# Patient Record
Sex: FEMALE | Race: OTHER | Employment: FULL TIME | URBAN - METROPOLITAN AREA
[De-identification: names, ages, dates, MRNs, and addresses within clinical notes are randomized per-mention and may not be internally consistent; named-entity substitution may affect disease eponyms.]

---

## 2017-03-17 ENCOUNTER — HOSPITAL ENCOUNTER (OUTPATIENT)
Dept: LAB | Age: 37
Discharge: HOME | End: 2017-03-17

## 2017-03-17 LAB
ALBUMIN SERPL BCP-MCNC: 3.8 G/DL (ref 3.5–5)
ALBUMIN/GLOB SERPL: 1 {RATIO} (ref 1.1–2.2)
ALP SERPL-CCNC: 87 U/L (ref 45–117)
ALT SERPL-CCNC: 26 U/L (ref 12–78)
ANION GAP BLD CALC-SCNC: 9 MMOL/L (ref 5–15)
AST SERPL W P-5'-P-CCNC: 16 U/L (ref 15–37)
BILIRUB SERPL-MCNC: 0.5 MG/DL (ref 0.2–1)
BUN SERPL-MCNC: 14 MG/DL (ref 6–20)
BUN/CREAT SERPL: 25 (ref 12–20)
CALCIUM SERPL-MCNC: 8.5 MG/DL (ref 8.5–10.1)
CHLORIDE SERPL-SCNC: 103 MMOL/L (ref 97–108)
CHOLEST SERPL-MCNC: 159 MG/DL
CO2 SERPL-SCNC: 25 MMOL/L (ref 21–32)
CREAT SERPL-MCNC: 0.55 MG/DL (ref 0.55–1.02)
GLOBULIN SER CALC-MCNC: 3.8 G/DL (ref 2–4)
GLUCOSE SERPL-MCNC: 92 MG/DL (ref 65–100)
HDLC SERPL-MCNC: 77 MG/DL
HDLC SERPL: 2.1 {RATIO} (ref 0–5)
LDLC SERPL CALC-MCNC: 71 MG/DL (ref 0–100)
LIPID PROFILE,FLP: NORMAL
POTASSIUM SERPL-SCNC: 3.9 MMOL/L (ref 3.5–5.1)
PROT SERPL-MCNC: 7.6 G/DL (ref 6.4–8.2)
SODIUM SERPL-SCNC: 137 MMOL/L (ref 136–145)
TRIGL SERPL-MCNC: 55 MG/DL (ref ?–150)
VLDLC SERPL CALC-MCNC: 11 MG/DL

## 2017-03-17 PROCEDURE — 80061 LIPID PANEL: CPT | Performed by: INTERNAL MEDICINE

## 2017-03-17 PROCEDURE — 80053 COMPREHEN METABOLIC PANEL: CPT | Performed by: INTERNAL MEDICINE

## 2017-04-05 ENCOUNTER — HOSPITAL ENCOUNTER (OUTPATIENT)
Dept: LAB | Age: 37
Discharge: HOME | End: 2017-04-05

## 2017-04-05 PROCEDURE — 87086 URINE CULTURE/COLONY COUNT: CPT | Performed by: NURSE PRACTITIONER

## 2017-04-07 LAB
BACTERIA SPEC CULT: NORMAL
CC UR VC: NORMAL
SERVICE CMNT-IMP: NORMAL

## 2017-04-26 ENCOUNTER — HOSPITAL ENCOUNTER (OUTPATIENT)
Dept: LAB | Age: 37
Discharge: HOME | End: 2017-04-26

## 2017-04-26 PROCEDURE — 88175 CYTOPATH C/V AUTO FLUID REDO: CPT | Performed by: NURSE PRACTITIONER

## 2017-04-26 PROCEDURE — 87624 HPV HI-RISK TYP POOLED RSLT: CPT | Performed by: NURSE PRACTITIONER

## 2017-11-22 ENCOUNTER — HOSPITAL ENCOUNTER (OUTPATIENT)
Dept: LAB | Age: 37
Discharge: HOME | End: 2017-11-22

## 2017-11-22 PROCEDURE — 87086 URINE CULTURE/COLONY COUNT: CPT | Performed by: NURSE PRACTITIONER

## 2017-11-24 LAB
BACTERIA SPEC CULT: NORMAL
CC UR VC: NORMAL
SERVICE CMNT-IMP: NORMAL

## 2018-03-29 ENCOUNTER — HOSPITAL ENCOUNTER (OUTPATIENT)
Dept: LAB | Age: 38
Discharge: HOME | End: 2018-03-29

## 2018-03-29 ENCOUNTER — OFFICE VISIT (OUTPATIENT)
Dept: FAMILY MEDICINE CLINIC | Age: 38
End: 2018-03-29

## 2018-03-29 VITALS
HEIGHT: 59 IN | WEIGHT: 158 LBS | DIASTOLIC BLOOD PRESSURE: 81 MMHG | BODY MASS INDEX: 31.85 KG/M2 | TEMPERATURE: 98.4 F | HEART RATE: 84 BPM | SYSTOLIC BLOOD PRESSURE: 109 MMHG

## 2018-03-29 DIAGNOSIS — R42 CHRONIC VERTIGO: ICD-10-CM

## 2018-03-29 DIAGNOSIS — G43.109 MIGRAINE WITH VERTIGO: Primary | ICD-10-CM

## 2018-03-29 DIAGNOSIS — G43.109 MIGRAINE WITH VERTIGO: ICD-10-CM

## 2018-03-29 LAB
ALBUMIN SERPL-MCNC: 3.7 G/DL (ref 3.5–5)
ALBUMIN/GLOB SERPL: 1 {RATIO} (ref 1.1–2.2)
ALP SERPL-CCNC: 82 U/L (ref 45–117)
ALT SERPL-CCNC: 22 U/L (ref 12–78)
ANION GAP SERPL CALC-SCNC: 7 MMOL/L (ref 5–15)
AST SERPL-CCNC: 17 U/L (ref 15–37)
BILIRUB SERPL-MCNC: 0.4 MG/DL (ref 0.2–1)
BUN SERPL-MCNC: 14 MG/DL (ref 6–20)
BUN/CREAT SERPL: 26 (ref 12–20)
CALCIUM SERPL-MCNC: 9.1 MG/DL (ref 8.5–10.1)
CHLORIDE SERPL-SCNC: 104 MMOL/L (ref 97–108)
CO2 SERPL-SCNC: 29 MMOL/L (ref 21–32)
CREAT SERPL-MCNC: 0.53 MG/DL (ref 0.55–1.02)
ERYTHROCYTE [DISTWIDTH] IN BLOOD BY AUTOMATED COUNT: 17.5 % (ref 11.5–14.5)
EST. AVERAGE GLUCOSE BLD GHB EST-MCNC: 117 MG/DL
GLOBULIN SER CALC-MCNC: 3.8 G/DL (ref 2–4)
GLUCOSE SERPL-MCNC: 80 MG/DL (ref 65–100)
HBA1C MFR BLD: 5.7 % (ref 4.2–6.3)
HCT VFR BLD AUTO: 33.1 % (ref 35–47)
HGB BLD-MCNC: 9.9 G/DL (ref 11.5–16)
MCH RBC QN AUTO: 22.1 PG (ref 26–34)
MCHC RBC AUTO-ENTMCNC: 29.9 G/DL (ref 30–36.5)
MCV RBC AUTO: 74 FL (ref 80–99)
NRBC # BLD: 0 K/UL (ref 0–0.01)
NRBC BLD-RTO: 0 PER 100 WBC
PLATELET # BLD AUTO: 358 K/UL (ref 150–400)
PMV BLD AUTO: 10.4 FL (ref 8.9–12.9)
POTASSIUM SERPL-SCNC: 3.5 MMOL/L (ref 3.5–5.1)
PROT SERPL-MCNC: 7.5 G/DL (ref 6.4–8.2)
RBC # BLD AUTO: 4.47 M/UL (ref 3.8–5.2)
SODIUM SERPL-SCNC: 140 MMOL/L (ref 136–145)
TSH SERPL DL<=0.05 MIU/L-ACNC: 1.05 UIU/ML (ref 0.36–3.74)
WBC # BLD AUTO: 7.4 K/UL (ref 3.6–11)

## 2018-03-29 PROCEDURE — 82728 ASSAY OF FERRITIN: CPT | Performed by: NURSE PRACTITIONER

## 2018-03-29 PROCEDURE — 85027 COMPLETE CBC AUTOMATED: CPT | Performed by: NURSE PRACTITIONER

## 2018-03-29 PROCEDURE — 83036 HEMOGLOBIN GLYCOSYLATED A1C: CPT | Performed by: NURSE PRACTITIONER

## 2018-03-29 PROCEDURE — 80053 COMPREHEN METABOLIC PANEL: CPT | Performed by: NURSE PRACTITIONER

## 2018-03-29 PROCEDURE — 84443 ASSAY THYROID STIM HORMONE: CPT | Performed by: NURSE PRACTITIONER

## 2018-03-29 RX ORDER — MECLIZINE HYDROCHLORIDE 25 MG/1
25 TABLET ORAL
Qty: 60 TAB | Refills: 3 | Status: SHIPPED | OUTPATIENT
Start: 2018-03-29 | End: 2018-10-18 | Stop reason: SDUPTHER

## 2018-03-29 RX ORDER — AMITRIPTYLINE HYDROCHLORIDE 25 MG/1
50 TABLET, FILM COATED ORAL
Qty: 60 TAB | Refills: 5 | Status: SHIPPED | OUTPATIENT
Start: 2018-03-29 | End: 2018-05-24 | Stop reason: SDUPTHER

## 2018-03-29 NOTE — PATIENT INSTRUCTIONS
Meclizina (Por la boca)   Sirve para tratar los síntomas de mareo por movimiento (cinetosis). También sirve para tratar el vértigo. Adonis(s) : Antivert, Antivert/25, Good Neighbor Motion Sickness Relief, Good Sense Motion Sickness II, [facility] Motion Sickness Relief, Medi-Meclizine, Motion Relief, Motion Sickness Relief, Motion-Time, [employer] Motion Sickness Relief, [facility] Motion Sickness   Existen muchas otras marcas de Dueñas. Belen medicamento no debe ser usado cuando:   No use belen medicamento si alguna vez patrick tenido Tresia Kawasaki reacción alérgica a la meclizina. Forma de usar belen medicamento:   Christine Common, Tableta [place]  · Hernandez médico le indicara cuanto medicamento necesita usar. No use más medicamento de lo indicado. · Tableta masticable: Mastique la tableta rickey 2 minutos por lo menos. · Mareo por movimiento: [place] belen medicamento por lo menos 1 hora antes de viajar en elizabeth de que lo esté usando para prevenir el mareo por movimiento. [place] dosis de [place] debe prevenir el mareo por movimiento o cinetosis rickey 24 horas. Si shannan dosis es olvidada:   · Belen medicamento por lo general sólo se kandice en elizabeth de necesidad. En elizabeth que usted esté tomando meclizina habitualmente, tómese la dosis [place] tan pronto lo recuerde. Sin embargo, si ya es clifford la hora para hernandez próxima dosis, mejor espere hasta entonces para emilee el medicamento y [place] la dosis [place]. No use medicina adicional para compensar shannan dosis [place]. Forma de guardar y botar belen medicamento:   · Guarde el medicamento en un recipiente cerrado a temperatura ambiente y alejado del calor, la humedad y la macey directa. · [address] [facility] de vencimiento haya expirado y las medicinas que ya no necesita siguiendo las instrucciones del farmacéutico, médico o paramédico.  · Guarde todos los medicamentos fuera del alcance de los niños. Nunca comparta moses medicamentos con [facility].   Medicamentos y alimentos que debe evitar:   Consulte con hernandez médico o farmacéutico antes de usar cualquier medicamento, incluyendo los que compra sin receta médica, las vitaminas y los productos herbales. · No consuma alcohol mientras esté . · Informe a hernandez médico si usted [place] cualquier cosa que le provoca sueño. Gwendolyn Curd son medicamentos para alergia o medicamentos narcóticos para el dolor y el alcohol. Precauciones rickey el uso de eric medicamento:   · Asegúrese de informar al médico que usted está embarazada o dando de lactar, o tiene shannan enfermedad renal, enfermedad hepática, asma, agrandamiento de próstata, glaucoma, insuficiencia cardíaca, o problemas al orinar. · Eric medicamento puede causar somnolencia. No maneje ni opere máquinas ni lon ninguna actividad que pueda resultar peligrosa hasta que usted sepa cómo lo afecta eric medicamento. Efectos secundarios que pueden presentarse rickey el uso de eric medicamento:   Consulte inmediatamente con el médico si nota cualquiera de estos efectos secundarios:  · Reacción alérgica: Vedia Prose o ronchas, hinchazón del lucretia o las candis, hinchazón u hormigueo en la boca o garganta, opresión en el pecho, dificultad para respirar  Consulte con el médico si nota los siguientes efectos secundarios menos graves:   · Visión borrosa  · Somnolencia  · La boca seca  · Dolor de michele  Consulte con el médico si nota otros efectos secundarios que sher son causados por eric medicamento. Llame a hernandez médico para consultarle Felicia. Usted puede notificar moses efectos secundarios al FDA al [phone]. © 2017 [facility] Information is for End User's use only and may not be sold, redistributed or otherwise used for commercial purposes. Esta información es sólo para uso en educación. Hernandez intención no es darle un consejo médico sobre enfermedades o tratamientos.  Colsulte con hernandez médico, enfermera o farmacéutico antes de seguir cualquier régimen médico para saber si es seguro y efectivo para usted.

## 2018-03-29 NOTE — PROGRESS NOTES
Patient seen for discharge with assistance from jesus Lo. We reviewed the AVS, prescriptions, pharmacy location and the printed Good Rx coupon for meclizine. The amitriptyline prescription did not require a coupon. She will go now to registration to schedule a 6-8 week provider follow-up appointment.  Nicolasa Puri RN

## 2018-03-29 NOTE — PROGRESS NOTES
Subjective:     Chief Complaint   Patient presents with    Dizziness     Dizziness, Nauseas, headaches X about 8 years   . Medication needed        She  is a 34 y.o. female who presents for evaluation of chronic vertigo. Pt was previously taking \"microser forte\" TID for chronic vertigo in [facility] and Pt reports she feels much improvement with this Rx. Onset approx 8 years ago, no acute injury/MVA prior to onset of S&S. Also notes Hx of migraines, occur w/ and w/o vertigo. Dx'd via H&P, No Hx of head imaging. Was given a yearly steroid injection, which helped. Has lived here since 2016. Notes diff w/ driving r/t vertigo. Dilated eye exam done in [facility] and was WNL, no glasses needed. Also notes a chronic buzzing sound in R ear. H/A is usually frontal, bilateral when it occurs. PMH: migraine and vertigo as noted above     Surg:   x 3     NKDA    Current Rx/supplements: See above     Social:  Pt denies tobacco, etoh nor drug use. Pt is currently working in laundry. Pt has 3 children and is . Family Hx: denies     Last pap was 2 years ago, WNL. Menses WNL except pain. No BC. Sterlizied after last . ROS  Gen - no fever/chills  Resp - no dyspnea or cough  CV - no chest pain or GRAY  Rest per HPI    No past medical history on file. No past surgical history on file. No current outpatient prescriptions on file prior to visit. No current facility-administered medications on file prior to visit.          Objective:     Vitals:    18 1108   BP: 109/81   Pulse: 84   Temp: 98.4 °F (36.9 °C)   TempSrc: Oral   Weight: 158 lb (71.7 kg)   Height: 4' 10.66\" (1.49 m)       Physical Examination:  General appearance - alert, well appearing, and in no distress  Eyes -sclera anicteric  Neck - supple, no significant adenopathy, no thyromegaly  Chest - clear to auscultation, no wheezes, rales or rhonchi, symmetric air entry  Heart - normal rate, regular rhythm, normal S1, S2, no murmurs, rubs, clicks or gallops  Neurological - alert, oriented, no focal findings or movement disorder noted    HENT exam unmerkable, PERRLA, CNs II-XII intact       Assessment/ Plan:   Diagnoses and all orders for this visit:    1. Migraine with vertigo  -     amitriptyline (ELAVIL) 25 mg tablet; Take 2 Tabs by mouth nightly. -     meclizine (ANTIVERT) 25 mg tablet; Take 1 Tab by mouth three (3) times daily as needed. -     CBC W/O DIFF; Future  -     TSH 3RD GENERATION; Future  -     METABOLIC PANEL, COMPREHENSIVE; Future  -     HEMOGLOBIN A1C WITH EAG; Future    2. Chronic vertigo  -     amitriptyline (ELAVIL) 25 mg tablet; Take 2 Tabs by mouth nightly. -     meclizine (ANTIVERT) 25 mg tablet; Take 1 Tab by mouth three (3) times daily as needed. ? Etiology of HENT/neuro DO causing chronic vertigo. Physical exam is unremarkable, which is reassuring, though Pt may need MRI of head in near future prior to specialist visit (ENT vs neuro?). Start Elavil taper 1/2 tab QHS x 4-5 nights until she reaches 50mg QHS. PRN Meclizine, which appears in same class as Rx from [facility]. Check baseline labs. RTC in 6-8 weeks for f/u. Work note given for return tomorrow. I have discussed the diagnosis with the patient and the intended plan as seen in the above orders. The patient has received an after-visit summary and questions were answered concerning future plans. I have discussed medication side effects and warnings with the patient as well. The patient verbalizes understanding and agreement with the plan.     Follow-up Disposition: Not on File

## 2018-03-30 LAB — FERRITIN SERPL-MCNC: 2 NG/ML (ref 8–252)

## 2018-03-30 NOTE — PROGRESS NOTES
Noted low blood counts, suspect NALDO. Will attempt to have lab add on ferritin and notify Pt of update.

## 2018-04-02 NOTE — PROGRESS NOTES
Please let Pt know her most recent labs showed anemia r/t lack of iron. I recommend she start taking either Rx or OTC iron, 1 tablet BID ( mg/total elemental iron/day) or I can send Rx to pharmacy. Also please discuss iron rich diet and taking iron 1 hr before meals on empty stomach and that some GI upset/constipation/dark stools are normal.   Repeat POC Hgb at [facility]. Pls msg me if Pt wants Rx sent for iron.    Thank you,  Boaz Scanlon

## 2018-04-04 NOTE — PROGRESS NOTES
Telephone call made to the patient with assistance from [employer]  # [phone]. We reviewed the provider's lab result note and suggestions for iron rich foods like lean red meat, beans, dark green leafy vegetables and iron fortified breads and cereals. The patient stated she would buy the iron supplement OTC and confirmed her follow-up appointment for 05-24-18.  Helen Christianson RN

## 2018-05-24 ENCOUNTER — OFFICE VISIT (OUTPATIENT)
Dept: FAMILY MEDICINE CLINIC | Age: 38
End: 2018-05-24

## 2018-05-24 VITALS
HEART RATE: 106 BPM | HEIGHT: 59 IN | WEIGHT: 152 LBS | BODY MASS INDEX: 30.64 KG/M2 | DIASTOLIC BLOOD PRESSURE: 84 MMHG | SYSTOLIC BLOOD PRESSURE: 121 MMHG

## 2018-05-24 DIAGNOSIS — R42 CHRONIC VERTIGO: ICD-10-CM

## 2018-05-24 DIAGNOSIS — R39.11 URINARY HESITANCY: ICD-10-CM

## 2018-05-24 DIAGNOSIS — G43.109 MIGRAINE WITH VERTIGO: ICD-10-CM

## 2018-05-24 DIAGNOSIS — E61.1 IRON DEFICIENCY: Primary | ICD-10-CM

## 2018-05-24 RX ORDER — FERROUS SULFATE 325(65) MG
325 TABLET, DELAYED RELEASE (ENTERIC COATED) ORAL
Qty: 60 TAB | Refills: 3 | Status: SHIPPED | OUTPATIENT
Start: 2018-05-24

## 2018-05-24 RX ORDER — AMITRIPTYLINE HYDROCHLORIDE 25 MG/1
35.5 TABLET, FILM COATED ORAL
Qty: 145 TAB | Refills: 3 | Status: SHIPPED | OUTPATIENT
Start: 2018-05-24 | End: 2019-02-14 | Stop reason: SDUPTHER

## 2018-05-24 NOTE — PROGRESS NOTES
Chief Complaint   Patient presents with    Dizziness     follow up     Coordination of Care  1. Have you been to the ER, urgent care clinic since your last visit? Hospitalized since your last visit? No    2. Have you seen or consulted any other health care providers outside of the [address] [place] [address] since your last visit? Include any pap smears or colon screening. No    Does the patient need refills? NO    Learning Assessment Complete?  yes

## 2018-05-24 NOTE — PATIENT INSTRUCTIONS
Dieta nestor en joseph: Instrucciones de cuidado - [ Iron-Rich Diet: Care Instructions ]  Instrucciones de cuidado    Hernandez organismo necesita joseph para producir hemoglobina. La hemoglobina es shannan sustancia presente en los glóbulos rojos que lleva el oxígeno de los pulmones a las células de todo el cuerpo. Si no tiene suficiente joseph, el organismo produce menos glóbulos rojos y de kellen tamaño. [address] células del organismo podrían no recibir suficiente oxígeno. Los hombres adultos necesitan 8 miligramos de joseph al día y las mujeres adultas necesitan 18 miligramos al día. Después de la menopausia, las mujeres necesitan 8 miligramos de joseph al día. Shannan flaco embarazada necesita 27 miligramos de joseph al día. Los bebés y niños pequeños tienen mayores necesidades de joseph en proporción a hernandez tamaño que otros grupos de Gus. Las personas que reyes perdido zeny debido a úlceras o períodos menstruales abundantes podrían tener niveles muy bajos de joseph y desarrollar anemia. [address] pueden obtener el joseph que hernandez cuerpo necesita comiendo suficiente cantidad de ciertos alimentos ricos en joseph. Hernandez médico podría recomendarle que tome un suplemento de joseph junto con shannan dieta nestor en joseph. La atención de seguimiento es shannan parte clave de hernandez tratamiento y seguridad. Asegúrese de hacer y acudir a todas las citas, y llame a hernandez médico si está teniendo problemas. También es shannan buena idea saber los resultados de los exámenes y mantener shannan lista de los medicamentos que kandice. ¿Cómo puede cuidarse en el hogar? · Travis que los alimentos ricos en [facility] chauncey parte de hernandez Annye Fey. Los alimentos ricos en [facility] incluyen:  ¨ Todas las beronica, kenna papo, res, galan, cerdo, pescado y mariscos. El hígado tiene un contenido especialmente alto de [facility]. ¨ Verduras de [employer]. ¨ Uvas pasas, chícharos (arvejas), frijoles (habichuelas), lentejas, cebada y huevos.   ¨ Cereales para el desayuno enriquecidos con joseph. · Consuma alimentos que contengan vitamina C junto con alimentos ricos en joseph. La vitamina C le ayuda a absorber más joseph de los alimentos. Diane un vaso de jugo de naranja u otro jugo cítrico con las comidas. · Coma carne y vegetales o Shayy Given. El joseph de la carne ayuda al organismo a absorber el joseph presente en otros alimentos. ¿Dónde puede encontrar más información en inglés? Viridiana Taylor a [email or web address]-[email or web address] Escriba Z290 en la búsqueda para aprender más acerca de \"Dieta nestor en joseph: Instrucciones de cuidado - [ Iron-Rich Diet: Care Instructions ]. \"  Revisado: 12 christina, 2017  Versión del contenido: 11.4  © 8014-9043 Healthwise, [employer]. Las instrucciones de cuidado fueron adaptadas bajo licencia por [employer] (which disclaims liability or warranty for this information). Si usted tiene [place] afección médica o sobre estas instrucciones, siempre pregunte a hernandez profesional de manasa. [place], [employer] niega toda garantía o responsabilidad por hernandez uso de esta información.

## 2018-05-24 NOTE — PROGRESS NOTES
Subjective:     Chief Complaint   Patient presents with    Dizziness     follow up        She  is a 34 y.o. female who presents for evaluation of chronic migraines. Since LOV, Pt reports significant improvement with migraines/dizziness since taking Elavil, 1/2 and 1 tablet QHS. Was Tx at [facility] for urinary hesistancy/incomplete voiding w/ Detrol LA. Notes little improvement w/ Rx. Could not renew at [facility] and ran out of Rx approx 2-3 weeks ago. Notes urine studies were done. No imaging. No episodes of incontinence. ROS  Gen - no fever/chills  Resp - no dyspnea or cough  CV - no chest pain or GRAY  Rest per HPI    History reviewed. No pertinent past medical history. History reviewed. No pertinent surgical history. Current Outpatient Prescriptions on File Prior to Visit   Medication Sig Dispense Refill    amitriptyline (ELAVIL) 25 mg tablet Take 2 Tabs by mouth nightly. 60 Tab 5    meclizine (ANTIVERT) 25 mg tablet Take 1 Tab by mouth three (3) times daily as needed. 60 Tab 3     No current facility-administered medications on file prior to visit. Objective:     Vitals:    05/24/18 1427   BP: 121/84   Pulse: (!) 106   Weight: 152 lb (68.9 kg)   Height: 4' 11.06\" (1.5 m)       Physical Examination:  General appearance - alert, well appearing, and in no distress    Counseling only. Assessment/ Plan:   Diagnoses and all orders for this visit:    1. Iron deficiency  -     ferrous sulfate (IRON) 325 mg (65 mg iron) EC tablet; Take 1 Tab by mouth ACB/HS. [place] 1 tableta por boca 2 veces al hilton antes de comer. 2. Migraine with vertigo  -     amitriptyline (ELAVIL) 25 mg tablet; Take 1.5 Tabs by mouth nightly. [place] 1 tableta y media por boca en la noche. 3. Chronic vertigo  -     amitriptyline (ELAVIL) 25 mg tablet; Take 1.5 Tabs by mouth nightly. [place] 1 tableta y media por boca en la noche.     4. Urinary hesitancy      Little benefit to repeating hgb today since Pt has not been taking OTC iron. Pt agreed to start Rx FeSO4. Discussed SEs and dosing. Refill Elavil at current dose. Given poor response to Detrol, recommend eval w/ [facility]. Check [facility] hgb at [facility]. Re-eval in 2 months. I have discussed the diagnosis with the patient and the intended plan as seen in the above orders. The patient has received an after-visit summary and questions were answered concerning future plans. I have discussed medication side effects and warnings with the patient as well. The patient verbalizes understanding and agreement with the plan.     Follow-up Disposition: Not on File

## 2018-05-24 NOTE — PROGRESS NOTES
Printed AVS, provided to pt and reviewed. Pt indicated understanding and had no questions. Told pt that rx's have been sent to pharmacy and they should be ready for  in approximately 2 hrs. Reviewed all medications with the pt Olu Corrigan. Sent pt to registrar to make appt for f/u in 6 weeks and [facility] urology. Masalanael Grimes was the .  Mani Del Toro RN

## 2018-06-21 ENCOUNTER — OFFICE VISIT (OUTPATIENT)
Dept: FAMILY MEDICINE CLINIC | Age: 38
End: 2018-06-21

## 2018-06-21 VITALS
SYSTOLIC BLOOD PRESSURE: 118 MMHG | DIASTOLIC BLOOD PRESSURE: 80 MMHG | HEART RATE: 115 BPM | WEIGHT: 151 LBS | TEMPERATURE: 99 F | BODY MASS INDEX: 30.44 KG/M2

## 2018-06-21 DIAGNOSIS — Z13.9 ENCOUNTER FOR SCREENING: Primary | ICD-10-CM

## 2018-06-21 DIAGNOSIS — R30.0 DYSURIA: Primary | ICD-10-CM

## 2018-06-21 LAB
BILIRUB UR QL STRIP: NEGATIVE
GLUCOSE UR-MCNC: NEGATIVE MG/DL
HGB BLD-MCNC: 9.5 G/DL
KETONES P FAST UR STRIP-MCNC: NEGATIVE MG/DL
PH UR STRIP: 6 [PH] (ref 4.6–8)
PROT UR QL STRIP: NEGATIVE
SP GR UR STRIP: 1.01 (ref 1–1.03)
UA UROBILINOGEN AMB POC: NORMAL (ref 0.2–1)
URINALYSIS CLARITY POC: CLEAR
URINALYSIS COLOR POC: YELLOW
URINE BLOOD POC: NORMAL
URINE LEUKOCYTES POC: NEGATIVE
URINE NITRITES POC: NEGATIVE

## 2018-06-21 RX ORDER — TOLTERODINE 4 MG/1
4 CAPSULE, EXTENDED RELEASE ORAL DAILY
Qty: 90 CAP | Refills: 3 | Status: SHIPPED | OUTPATIENT
Start: 2018-06-21 | End: 2019-02-14

## 2018-06-21 NOTE — PROGRESS NOTES
Results for orders placed or performed in visit on 06/21/18   AMB POC URINALYSIS DIP STICK MANUAL W/O MICRO   Result Value Ref Range    Color (UA POC) Yellow     Clarity (UA POC) Clear     Glucose (UA POC) Negative Negative    Bilirubin (UA POC) Negative Negative    Ketones (UA POC) Negative Negative    Specific gravity (UA POC) 1.015 1.001 - 1.035    Blood (UA POC) Trace Negative    pH (UA POC) 6.0 4.6 - 8.0    Protein (UA POC) Negative Negative    Urobilinogen (UA POC) normal 0.2 - 1    Nitrites (UA POC) Negative Negative    Leukocyte esterase (UA POC) Negative Negative   AMB POC HEMOGLOBIN (HGB)   Result Value Ref Range    Hemoglobin (POC) 9.5

## 2018-06-21 NOTE — PROGRESS NOTES
Eunice Goff   Reviewed escribed rx for Detrol LA 4mg ER capsule. Rx to be filled through [facility]. Additionally, patient referred to [facility] to see Urology specialist. Explained [facility] program and required financial screening and income documentation. Both programs require financial screenings with the [facility] , Citlalli Ashraf. Patient given appointment to meet with Sarah Cooper for 6/22/2018 at 12pm at [facility] site. AVs printed, reviewed and provided to patient. Communicated with patient via , Devang Rey. Expressed understanding of above stated items and had no further questions. Audelia Martínez RN    [place] [address]  Will send \"ZHBMN message\" to Dr. Jose Cervantes to enter Urology referral. Audelia Martínez RN

## 2018-06-21 NOTE — PROGRESS NOTES
34year old female with symptom of suprapubic discomfort with voiding. She also had frequncy. The symptoms were present for six months. She was given Detrol LA at [facility]. She had help to about 65%. She still has frequency and she has pain is she holds it. She has some urgency but no urge incontinence. No significant hx of UTIs. No stress incontinence. She denies any foods causing symptoms. After BMs she has to go to void multiple times. At this point, I am limited in what I can do and I would like to refer her to urology through [facility].

## 2018-06-21 NOTE — MR AVS SNAPSHOT
[address] 
 
 
 [address] [facility] [address] [facility] 
[phone] Patient: Molly Rachel MRN: [id number] YSR:[id number] Visit Information Naina Chowdhurygeoffrey Personal Médico Departamento Teléfono del Dep. Número de visita 6/21/2018  2:00 PM Clara Paz MD [address] [phone] Your Appointments 6/22/2018 12:00 PM  
SOCIAL WORKERS with Ree Osman [place] [id number] [facility] Appt Note: [facility] and [facility] now by [facility] [address] [facility] 57  
[phone]  
  
   
 [address] [facility] [address] [facility] [address]  
  
    
 7/5/2018 12:30 PM  
Follow Up with Efrain Santiago NP  
[address] ([facility]) Appt Note: Follow up (8 weeks) per SO by [facility] [address] [facility] 7 [address]  
[phone]  
  
   
 [address] [facility] [address] [facility] 7 [address] Upcoming Health Maintenance Date Due DTaP/Tdap/Td series (1 - Tdap) 1/24/1999 PAP AKA CERVICAL CYTOLOGY 1/24/1999 Influenza Age 5 to Adult 8/1/2018 Alergias  Review Complete El: 5/24/2018 Por: BARBARA Ruiz Luis del:  6/21/2018 No Known Allergies Vacunas actuales All Commander No hay ninguna vacuna archivada. No revisadas esta visita You Were Diagnosed With   
  
 Kingsley Eaton Encounter for screening    -  Primary ICD-10-CM: Z13.9 ICD-9-CM: V82.9 Partes vitales PS Pulso Temperatura Peso (percentil de crecimiento) LMP (última ben) BMI (IMC)  
 118/80 (BP 1 Location: Right arm, BP Patient Position: Sitting) (!) 115 99 °F (37.2 °C) (Oral) 151 lb (68.5 kg) 06/18/2018 30.44 kg/m2 Estado obstétrico Estatus de tabaquísmo Having regular periods Never Smoker Historial de signos vitales BMI and BSA Data Body Mass Index Body Surface Area  
 30.44 kg/m 2 1.69 m 2 [facility] Name Phone [address] [place] [address] [phone] Bustamante lista de medicamentos actualizada Wolf Cerise actualizada 6/21/18  2:51 PM.  Nette Carr use bustamante lista de medicamentos más reciente. amitriptyline 25 mg tablet También conocido kenna:  ELAVIL Take 1.5 Tabs by mouth nightly. [place] 1 tableta y media por boca en la noche. ferrous sulfate 325 mg (65 mg iron) EC tablet También conocido kenna:  IRON Take 1 Tab by mouth ACB/HS. [place] 1 tableta por boca 2 veces al hilton antes de comer. meclizine 25 mg tablet También conocido kenna:  ANTIVERT Take 1 Tab by mouth three (3) times daily as needed. tolterodine ER 4 mg ER capsule También conocido kenna:  DETROL LA Take 1 Cap by mouth daily for 90 days. Indications: INCREASED URINARY FREQUENCY Recetas Enviado a la [place] Refills  
 tolterodine ER (DETROL LA) 4 mg ER capsule 3 Sig: Take 1 Cap by mouth daily for 90 days. Indications: INCREASED URINARY FREQUENCY Class: Normal  
 Pharmacy: [address] [place] [address] #: [phone] Route: Oral  
  
Hicimos lo siguiente AMB POC HEMOGLOBIN (HGB) [37818 CPT(R)] AMB POC URINALYSIS DIP STICK MANUAL W/O MICRO [22933 CPT(R)] Introducing [facility]! [facility] introduce portal paciente MyChart . Ahora se puede acceder a partes de bustamante expediente médico, enviar por correo electrónico la oficina de bustamante médico y solicitar renovaciones de medicamentos en línea. En bustamante navegador de Internet , Alden Vega a [email or web address] Travis clic en el usuario por Caio Bhandari? Alfrednayeli Hernandez clic aquí en la sesión [facility]. Verá la página de registro [place]. Ingrese bustamante código de [facility] marcela y kenna aparece a continuación. Usted no tendrá que [employer] código después de chris completado el proceso de registro . Si usted no se inscribe antes de la fecha de caducidad , debe solicitar un nuevo código. · MyChart Código de acceso : [id number] Expires: 8/22/2018  2:28 PM 
 
Ingresa los últimos cuatro dígitos de hernandez Número de Seguro Social ( xxxx ) y fecha de nacimiento ( dd / mm / aaaa ) kenna se indica y travis clic en Enviar. Usted será llevado a la siguiente página de registro . Crear un ID MyChart . Esta será hernandez ID de inicio de sesión de MyChart y no puede ser [place] , por lo que pensar en shannan que es Drusilla Scott y fácil de recordar . Crear shannan contraseña MyChart . Usted puede cambiar hernandez contraseña en cualquier momento . Ingrese hernandez Password Reset de preguntas y [place] . [place] se puede utilizar en un momento posterior si usted olvida hernandez contraseña. Introduzca hernandez dirección de correo electrónico . Lluvia Russell recibirá shannan notificación por correo electrónico cuando la nueva información está disponible en MyChart . Vernell Valentine clic en Registrarse. Franny Mendenhall dale y descargar porciones de hernandez expediente médico. 
Travis clic en el enlace de descarga del menú Resumen para descargar shannan copia portátil de hernandez información médica . Si tiene [employer] , por favor visite la sección de preguntas frecuentes del sitio web MyChart . Recuerde, MyChart NO es que se utilizará para las necesidades urgentes. Para emergencias médicas , llame al 911 . Ahora disponible en hernandez iPhone y Android ! Por favor proporcione eric resumen de la documentación de cuidado a hernandez próximo proveedor. If you have any questions after today's visit, please call [phone].

## 2018-06-22 ENCOUNTER — OFFICE VISIT (OUTPATIENT)
Dept: FAMILY MEDICINE CLINIC | Age: 38
End: 2018-06-22

## 2018-06-22 DIAGNOSIS — Z71.89 COUNSELING AND COORDINATION OF CARE: Primary | ICD-10-CM

## 2018-06-22 NOTE — PROGRESS NOTES
Met with patient for [facility] referral and [facility] Referral. Pay stub pending for both.

## 2018-06-29 ENCOUNTER — OFFICE VISIT (OUTPATIENT)
Dept: FAMILY MEDICINE CLINIC | Age: 38
End: 2018-06-29

## 2018-06-29 DIAGNOSIS — Z71.89 COUNSELING AND COORDINATION OF CARE: Primary | ICD-10-CM

## 2018-07-02 NOTE — PROGRESS NOTES
Met with patient for [facility] Referral and [facility] Referral. Sent AN referral to Anthony Villanueva at [facility] and [facility] referral to [facility] for faxing.  Mirela Gibbons

## 2018-10-11 RX ORDER — MECLIZINE HYDROCHLORIDE 25 MG/1
TABLET ORAL
Qty: 60 TAB | Refills: 3 | OUTPATIENT
Start: 2018-10-11

## 2018-10-11 NOTE — TELEPHONE ENCOUNTER
Incoming fax from [facility] requesting a refill for Meclizine. As pt has not been seen on the [facility] 15, request was denied and faxed to [facility].  Liberty Weldon RN

## 2018-10-18 ENCOUNTER — OFFICE VISIT (OUTPATIENT)
Dept: FAMILY MEDICINE CLINIC | Age: 38
End: 2018-10-18

## 2018-10-18 VITALS
SYSTOLIC BLOOD PRESSURE: 112 MMHG | DIASTOLIC BLOOD PRESSURE: 78 MMHG | HEART RATE: 103 BPM | BODY MASS INDEX: 31.45 KG/M2 | TEMPERATURE: 97.9 F | WEIGHT: 156 LBS

## 2018-10-18 DIAGNOSIS — G43.109 MIGRAINE WITH VERTIGO: ICD-10-CM

## 2018-10-18 DIAGNOSIS — D50.0 IRON DEFICIENCY ANEMIA DUE TO CHRONIC BLOOD LOSS: Primary | ICD-10-CM

## 2018-10-18 DIAGNOSIS — E61.1 IRON DEFICIENCY: ICD-10-CM

## 2018-10-18 DIAGNOSIS — Z13.9 ENCOUNTER FOR SCREENING: ICD-10-CM

## 2018-10-18 LAB — HGB BLD-MCNC: 8.5 G/DL

## 2018-10-18 RX ORDER — IBUPROFEN 600 MG/1
600 TABLET ORAL
Qty: 40 TAB | Refills: 1 | Status: SHIPPED | OUTPATIENT
Start: 2018-10-18 | End: 2019-02-14

## 2018-10-18 RX ORDER — MECLIZINE HYDROCHLORIDE 25 MG/1
25 TABLET ORAL
Qty: 60 TAB | Refills: 3 | Status: SHIPPED | OUTPATIENT
Start: 2018-10-18 | End: 2019-04-03 | Stop reason: SDUPTHER

## 2018-10-18 RX ORDER — LORATADINE 10 MG/1
10 TABLET ORAL DAILY
Qty: 30 TAB | Refills: 11 | Status: SHIPPED | OUTPATIENT
Start: 2018-10-18 | End: 2019-02-14

## 2018-10-18 RX ORDER — DOCUSATE SODIUM 100 MG/1
100 CAPSULE, LIQUID FILLED ORAL 2 TIMES DAILY
Qty: 60 CAP | Refills: 5 | Status: SHIPPED | OUTPATIENT
Start: 2018-10-18 | End: 2019-02-14

## 2018-10-18 NOTE — PROGRESS NOTES
HISTORY OF PRESENT ILLNESS Gurdeep Wilson is a 34 y.o. female. Medication Refill ROS Physical Exam 
 
ASSESSMENT and PLAN 
{ASSESSMENT/PLAN:65602}

## 2018-10-18 NOTE — PROGRESS NOTES
AVS printed and reviewed. E scripts reviewed. [facility] was never called by pt after she met w/ Krunal Greene on 6/22/18 and she confirmed this. Reviewed process will need to be done again as [facility] drops application after 60 days and instructed to schedule appt w/ . Pt asked about hgb and chart reviewed 8.5 noted. Encouraged foods rich in iron and multivitamin w/ iron. Discussion assisted by CAV , Stormy Potter.

## 2018-10-18 NOTE — PROGRESS NOTES
Subjective: Chief Complaint Patient presents with  Medication Refill  
 
she is a 34y.o. year old female who presents for evalution. No past medical history on file. Objective:  
 
Vitals:  
 10/18/18 0908 BP: 112/78 Pulse: (!) 103 Temp: 97.9 °F (36.6 °C) TempSrc: Oral  
Weight: 156 lb (70.8 kg) Physical Examination: {PE ADULT/PEDS  MALE/FEMALE:78600} Assessment/ Plan: The primary encounter diagnosis was Encounter for screening. Diagnoses of Migraine with vertigo and Chronic vertigo were also pertinent to this visit. Results for orders placed or performed in visit on 10/18/18 AMB POC HEMOGLOBIN (HGB) Result Value Ref Range Hemoglobin (POC) 8.5 Orders Placed This Encounter  AMB POC HEMOGLOBIN (HGB)  ibuprofen (MOTRIN) 600 mg tablet Sig: Take 1 Tab by mouth every six (6) hours as needed for Pain. Start 1 day prior to menses and continue through menses, Welsh. Dispense:  40 Tab Refill:  1  
 docusate sodium (COLACE) 100 mg capsule Sig: Take 1 Cap by mouth two (2) times a day. For constipation Dispense:  60 Cap Refill:  5  
 loratadine (CLARITIN) 10 mg tablet Sig: Take 1 Tab by mouth daily. For allergies, Welsh. Dispense:  30 Tab Refill:  11  
 meclizine (ANTIVERT) 25 mg tablet Sig: Take 1 Tab by mouth three (3) times daily as needed. Dispense:  60 Tab Refill:  3 Follow-up Disposition: Not on File

## 2018-10-18 NOTE — PROGRESS NOTES
Subjective:     Chief Complaint   Patient presents with    Medication Refill     she is a 34y.o. year old female who presents for evalution. Reports she is not taking the iron because it caused constipation. Has heavy very painful menses monthly. Periods are about 4 days, 10-12 pads and the dysmenorrhea is present about every second month. Last pap about 18 months ago and was told it was normal.  Has had BTL. Uses advil which does help the pain some. I asked her about depression since PHQ2 was +. She reports she cam here about 18months ago when things in [facility] started getting bad. Is here with  and 3 childrean, and has friends here. But has a lot of family there and worries a lot about them. Didn't get appt with Uro although was approved for AN, unclear to me why. History reviewed. No pertinent past medical history. Objective:     Vitals:    10/18/18 0908   BP: 112/78   Pulse: (!) 103   Temp: 97.9 °F (36.6 °C)   TempSrc: Oral   Weight: 156 lb (70.8 kg)       Physical Examination: General appearance - alert, well appearing, and in no distress  Neck - supple, no significant adenopathy  Chest - clear to auscultation, no wheezes, rales or rhonchi, symmetric air entry  Heart - normal rate, regular rhythm, normal S1, S2, no murmurs, rubs, clicks or gallops  Abdomen - tenderness noted across lower abd, with no masses or uterine enlargement palpated abdominally. Soft, NT otherwise with NABS. Assessment/ Plan:     1.anemia due to fe def from menses. Start Fe and will also rx colace. 2.  Sadness without depression due to very difficult family situation. Offered support, may need referral for counseling at some point. 3.  Dysmenorrhea with menorrhagia (?)-- try starting nsaid 1 day prior to menses and then continuing for several days. 4.  Urge incont-- to discuss referral with outreach.     Results for orders placed or performed in visit on 10/18/18   AMB POC HEMOGLOBIN (HGB)   Result Value Ref Range    Hemoglobin (POC) 8.5        Orders Placed This Encounter    AMB POC HEMOGLOBIN (HGB)    ibuprofen (MOTRIN) 600 mg tablet     Sig: Take 1 Tab by mouth every six (6) hours as needed for Pain. Start 1 day prior to menses and continue through menses, Ethiopian. Dispense:  40 Tab     Refill:  1    docusate sodium (COLACE) 100 mg capsule     Sig: Take 1 Cap by mouth two (2) times a day. For constipation     Dispense:  60 Cap     Refill:  5    loratadine (CLARITIN) 10 mg tablet     Sig: Take 1 Tab by mouth daily. For allergies, Ethiopian. Dispense:  30 Tab     Refill:  11    meclizine (ANTIVERT) 25 mg tablet     Sig: Take 1 Tab by mouth three (3) times daily as needed. Dispense:  60 Tab     Refill:  3                         ROS    Physical Exam    ASSESSMENT and PLAN    ICD-10-CM ICD-9-CM    1. Encounter for screening Z13.9 V82.9 AMB POC HEMOGLOBIN (HGB)   2. Migraine with vertigo G43.109 346.80 meclizine (ANTIVERT) 25 mg tablet     780.4    3.  Chronic vertigo R42 780.4 meclizine (ANTIVERT) 25 mg tablet

## 2018-10-18 NOTE — PROGRESS NOTES
Coordination of Care  1. Have you been to the ER, urgent care clinic since your last visit? Hospitalized since your last visit? No    2. Have you seen or consulted any other health care providers outside of the [address] [place] [address] since your last visit? Include any pap smears or colon screening. No    Does the patient need refills? YES    Learning Assessment Complete?  yes  Depression Screening complete in the past 12 months? yes     Results for orders placed or performed in visit on 10/18/18   AMB POC HEMOGLOBIN (HGB)   Result Value Ref Range    Hemoglobin (POC) 8.5

## 2019-02-14 ENCOUNTER — OFFICE VISIT (OUTPATIENT)
Dept: FAMILY MEDICINE CLINIC | Age: 39
End: 2019-02-14

## 2019-02-14 VITALS
WEIGHT: 151.6 LBS | SYSTOLIC BLOOD PRESSURE: 140 MMHG | DIASTOLIC BLOOD PRESSURE: 93 MMHG | BODY MASS INDEX: 30.56 KG/M2 | HEART RATE: 117 BPM | TEMPERATURE: 98.5 F

## 2019-02-14 DIAGNOSIS — R03.0 ELEVATED BLOOD-PRESSURE READING, WITHOUT DIAGNOSIS OF HYPERTENSION: Primary | ICD-10-CM

## 2019-02-14 DIAGNOSIS — R04.0 EPISTAXIS: ICD-10-CM

## 2019-02-14 DIAGNOSIS — R23.2 FLUSHING: ICD-10-CM

## 2019-02-14 DIAGNOSIS — G43.109 MIGRAINE WITH VERTIGO: ICD-10-CM

## 2019-02-14 DIAGNOSIS — R42 CHRONIC VERTIGO: ICD-10-CM

## 2019-02-14 LAB — HGB BLD-MCNC: 14.2 G/DL

## 2019-02-14 RX ORDER — ATENOLOL 25 MG/1
25 TABLET ORAL DAILY
Qty: 90 TAB | Refills: 1 | Status: SHIPPED | OUTPATIENT
Start: 2019-02-14 | End: 2019-04-03 | Stop reason: SDUPTHER

## 2019-02-14 RX ORDER — AMITRIPTYLINE HYDROCHLORIDE 25 MG/1
35.5 TABLET, FILM COATED ORAL
Qty: 145 TAB | Refills: 3 | Status: SHIPPED | OUTPATIENT
Start: 2019-02-14 | End: 2019-04-03 | Stop reason: SDUPTHER

## 2019-02-14 NOTE — PROGRESS NOTES
Coordination of Care  1. Have you been to the ER, urgent care clinic since your last visit? Hospitalized since your last visit? No    2. Have you seen or consulted any other health care providers outside of the [address] [place] [address] since your last visit? Include any pap smears or colon screening. No    Does the patient need refills? YES    Learning Assessment Complete?  yes  Depression Screening complete in the past 12 months? yes     Results for orders placed or performed in visit on 02/14/19   AMB POC HEMOGLOBIN (HGB)   Result Value Ref Range    Hemoglobin (POC) 14.2

## 2019-02-14 NOTE — PROGRESS NOTES
AVS printed and reviewed. Will return to a [facility] clinic tomorrow for fasting labs. Instructed to schedule appt at registration. E scripts sent to Jackie reviewed, no Good Rx coupon required. Discussion assisted by [facility] , Sammi Snowden.

## 2019-02-14 NOTE — PROGRESS NOTES
HISTORY OF PRESENT ILLNESS  Eugenia Santamaria is a 37 y.o. female. HPI  Patient states she is feeling well. She has been taking iron and is feeling better now. States she has been having flushing. Feels warm. Dizziness. States when she goes to a place where a heater is on  She feels dizzy. Patient states she takes amitriptyline at night. Father has hypertension. Review of Systems   Constitutional: Negative for chills and fever. HENT: Positive for nosebleeds. Eyes: Negative for blurred vision and double vision. Respiratory: Negative for cough, shortness of breath and wheezing. Cardiovascular: Positive for palpitations. Negative for chest pain. Gastrointestinal: Negative for abdominal pain, constipation, diarrhea, heartburn, nausea and vomiting. Musculoskeletal: Negative for myalgias and neck pain. Neurological: Negative for dizziness, tremors and headaches. Endo/Heme/Allergies:        Heat intolerance. Facial flushing     BP (!) 140/93 (BP 1 Location: Left arm)   Pulse (!) 117   Temp 98.5 °F (36.9 °C) (Oral)   Wt 151 lb 9.6 oz (68.8 kg)   LMP 01/24/2019   BMI 30.56 kg/m²   Physical Exam   Constitutional: She is oriented to person, place, and time. She appears well-developed and well-nourished. HENT:   Head: Normocephalic. Right Ear: External ear normal.   Left Ear: External ear normal.   Eyes: Conjunctivae and EOM are normal. Pupils are equal, round, and reactive to light. Neck: Normal range of motion. Neck supple. No tracheal deviation present. No thyromegaly present. Cardiovascular: Regular rhythm. tachycardia   Pulmonary/Chest: Effort normal and breath sounds normal. No respiratory distress. She has no wheezes. She has no rales. Abdominal: Soft. Bowel sounds are normal. She exhibits no distension. There is no tenderness. Musculoskeletal: Normal range of motion. She exhibits no edema. Neurological: She is alert and oriented to person, place, and time.  She has normal reflexes. Skin: Skin is warm. No rash noted. No pallor. ASSESSMENT and PLAN  Diagnoses and all orders for this visit:    1. Elevated blood-pressure reading, without diagnosis of hypertension  -     AMB POC HEMOGLOBIN (HGB)  -     atenolol (TENORMIN) 25 mg tablet; Take 1 Tab by mouth daily.  -     LIPID PANEL; Future  -     METABOLIC PANEL, COMPREHENSIVE; Future  -     CBC WITH AUTOMATED DIFF; Future  -     TSH 3RD GENERATION; Future  -     T4, FREE; Future  -     HEMOGLOBIN A1C WITH EAG; Future    2. Epistaxis  -     atenolol (TENORMIN) 25 mg tablet; Take 1 Tab by mouth daily. 3. Flushing    4. Migraine with vertigo  -     amitriptyline (ELAVIL) 25 mg tablet; Take 1.5 Tabs by mouth nightly. [place] 1 tableta y media por boca en la noche. 5. Chronic vertigo  -     amitriptyline (ELAVIL) 25 mg tablet; Take 1.5 Tabs by mouth nightly. [place] 1 tableta y media por boca en la noche.       Will come in later for fasting labs

## 2019-02-15 ENCOUNTER — LAB ONLY (OUTPATIENT)
Dept: FAMILY MEDICINE CLINIC | Age: 39
End: 2019-02-15

## 2019-02-15 ENCOUNTER — HOSPITAL ENCOUNTER (OUTPATIENT)
Dept: LAB | Age: 39
Discharge: HOME | End: 2019-02-15

## 2019-02-15 DIAGNOSIS — R23.2 FLUSHING: ICD-10-CM

## 2019-02-15 DIAGNOSIS — R03.0 ELEVATED BLOOD-PRESSURE READING, WITHOUT DIAGNOSIS OF HYPERTENSION: ICD-10-CM

## 2019-02-15 LAB
ALBUMIN SERPL-MCNC: 4 G/DL (ref 3.5–5)
ALBUMIN/GLOB SERPL: 1.1 {RATIO} (ref 1.1–2.2)
ALP SERPL-CCNC: 104 U/L (ref 45–117)
ALT SERPL-CCNC: 36 U/L (ref 12–78)
ANION GAP SERPL CALC-SCNC: 6 MMOL/L (ref 5–15)
AST SERPL-CCNC: 28 U/L (ref 15–37)
BASOPHILS # BLD: 0 K/UL (ref 0–0.1)
BASOPHILS NFR BLD: 1 % (ref 0–1)
BILIRUB SERPL-MCNC: 0.5 MG/DL (ref 0.2–1)
BUN SERPL-MCNC: 13 MG/DL (ref 6–20)
BUN/CREAT SERPL: 20 (ref 12–20)
CALCIUM SERPL-MCNC: 9.2 MG/DL (ref 8.5–10.1)
CHLORIDE SERPL-SCNC: 103 MMOL/L (ref 97–108)
CHOLEST SERPL-MCNC: 193 MG/DL
CO2 SERPL-SCNC: 27 MMOL/L (ref 21–32)
CREAT SERPL-MCNC: 0.65 MG/DL (ref 0.55–1.02)
DIFFERENTIAL METHOD BLD: ABNORMAL
EOSINOPHIL # BLD: 0.1 K/UL (ref 0–0.4)
EOSINOPHIL NFR BLD: 2 % (ref 0–7)
ERYTHROCYTE [DISTWIDTH] IN BLOOD BY AUTOMATED COUNT: 14.6 % (ref 11.5–14.5)
GLOBULIN SER CALC-MCNC: 3.6 G/DL (ref 2–4)
GLUCOSE SERPL-MCNC: 95 MG/DL (ref 65–100)
HCT VFR BLD AUTO: 42.3 % (ref 35–47)
HDLC SERPL-MCNC: 71 MG/DL
HDLC SERPL: 2.7 {RATIO} (ref 0–5)
HGB BLD-MCNC: 13.6 G/DL (ref 11.5–16)
IMM GRANULOCYTES # BLD AUTO: 0 K/UL (ref 0–0.04)
IMM GRANULOCYTES NFR BLD AUTO: 1 % (ref 0–0.5)
LDLC SERPL CALC-MCNC: 104.2 MG/DL (ref 0–100)
LIPID PROFILE,FLP: ABNORMAL
LYMPHOCYTES # BLD: 2.2 K/UL (ref 0.8–3.5)
LYMPHOCYTES NFR BLD: 34 % (ref 12–49)
MCH RBC QN AUTO: 27.6 PG (ref 26–34)
MCHC RBC AUTO-ENTMCNC: 32.2 G/DL (ref 30–36.5)
MCV RBC AUTO: 85.8 FL (ref 80–99)
MONOCYTES # BLD: 0.6 K/UL (ref 0–1)
MONOCYTES NFR BLD: 8 % (ref 5–13)
NEUTS SEG # BLD: 3.6 K/UL (ref 1.8–8)
NEUTS SEG NFR BLD: 54 % (ref 32–75)
NRBC # BLD: 0 K/UL (ref 0–0.01)
NRBC BLD-RTO: 0 PER 100 WBC
PLATELET # BLD AUTO: 306 K/UL (ref 150–400)
PMV BLD AUTO: 10 FL (ref 8.9–12.9)
POTASSIUM SERPL-SCNC: 4.5 MMOL/L (ref 3.5–5.1)
PROT SERPL-MCNC: 7.6 G/DL (ref 6.4–8.2)
RBC # BLD AUTO: 4.93 M/UL (ref 3.8–5.2)
SODIUM SERPL-SCNC: 136 MMOL/L (ref 136–145)
T4 FREE SERPL-MCNC: 1.1 NG/DL (ref 0.8–1.5)
TRIGL SERPL-MCNC: 89 MG/DL (ref ?–150)
TSH SERPL DL<=0.05 MIU/L-ACNC: 0.46 UIU/ML (ref 0.36–3.74)
VLDLC SERPL CALC-MCNC: 17.8 MG/DL
WBC # BLD AUTO: 6.6 K/UL (ref 3.6–11)

## 2019-02-15 PROCEDURE — 80053 COMPREHEN METABOLIC PANEL: CPT

## 2019-02-15 PROCEDURE — 84443 ASSAY THYROID STIM HORMONE: CPT

## 2019-02-15 PROCEDURE — 83835 ASSAY OF METANEPHRINES: CPT

## 2019-02-15 PROCEDURE — 83036 HEMOGLOBIN GLYCOSYLATED A1C: CPT

## 2019-02-15 PROCEDURE — 80061 LIPID PANEL: CPT

## 2019-02-15 PROCEDURE — 85025 COMPLETE CBC W/AUTO DIFF WBC: CPT

## 2019-02-15 PROCEDURE — 84439 ASSAY OF FREE THYROXINE: CPT

## 2019-02-15 NOTE — PROGRESS NOTES
Pt was here today for labs only. A1C, CBC, lipid, CMp, TSH, T4, A1C were drawn today on RA with no complications.  Larraine Linen

## 2019-02-16 LAB
EST. AVERAGE GLUCOSE BLD GHB EST-MCNC: 123 MG/DL
HBA1C MFR BLD: 5.9 % (ref 4.2–6.3)

## 2019-02-18 NOTE — PROGRESS NOTES
Int # [id number]. Tc to the pt. No answer. A message was left for the pt to contact the [facility] office.  Natanael Wooten RN

## 2019-02-18 NOTE — PROGRESS NOTES
Normal blood count, thyroid, kidney function, liver function, electrolytes,  Hemoglobin a1c is 5.9% which means she is at high risk of developing diabetes  Needs to adjust diet.   Decrease sugar in the diet, avoid bread, rice, pasta, tortillas, sweets, soft drinks  Patient can be informed

## 2019-02-19 ENCOUNTER — TELEPHONE (OUTPATIENT)
Dept: FAMILY MEDICINE CLINIC | Age: 39
End: 2019-02-19

## 2019-02-19 LAB
METANEPH FREE SERPL-MCNC: 11 PG/ML (ref 0–62)
NORMETANEPHRINE SERPL-MCNC: 95 PG/ML (ref 0–145)

## 2019-02-19 NOTE — TELEPHONE ENCOUNTER
Pt called on 2/18/19 left VM but no reason behind the call. Wanted to make note of this. Called back and left VM. Stated she was unable to  phone during 6am-5pm due to work.

## 2019-02-28 ENCOUNTER — HOSPITAL ENCOUNTER (OUTPATIENT)
Dept: LAB | Age: 39
Discharge: HOME | End: 2019-02-28

## 2019-02-28 PROCEDURE — 87086 URINE CULTURE/COLONY COUNT: CPT

## 2019-03-02 LAB
BACTERIA SPEC CULT: NORMAL
CC UR VC: NORMAL
SERVICE CMNT-IMP: NORMAL

## 2019-03-29 ENCOUNTER — HOSPITAL ENCOUNTER (OUTPATIENT)
Dept: LAB | Age: 39
Discharge: HOME | End: 2019-03-29

## 2019-03-29 PROCEDURE — 80053 COMPREHEN METABOLIC PANEL: CPT

## 2019-03-29 PROCEDURE — 80061 LIPID PANEL: CPT

## 2019-03-30 LAB
ALBUMIN SERPL-MCNC: 3.7 G/DL (ref 3.5–5)
ALBUMIN/GLOB SERPL: 1 {RATIO} (ref 1.1–2.2)
ALP SERPL-CCNC: 96 U/L (ref 45–117)
ALT SERPL-CCNC: 28 U/L (ref 12–78)
ANION GAP SERPL CALC-SCNC: 6 MMOL/L (ref 5–15)
AST SERPL-CCNC: 23 U/L (ref 15–37)
BILIRUB SERPL-MCNC: 0.4 MG/DL (ref 0.2–1)
BUN SERPL-MCNC: 14 MG/DL (ref 6–20)
BUN/CREAT SERPL: 24 (ref 12–20)
CALCIUM SERPL-MCNC: 9.1 MG/DL (ref 8.5–10.1)
CHLORIDE SERPL-SCNC: 106 MMOL/L (ref 97–108)
CHOLEST SERPL-MCNC: 199 MG/DL
CO2 SERPL-SCNC: 27 MMOL/L (ref 21–32)
CREAT SERPL-MCNC: 0.59 MG/DL (ref 0.55–1.02)
GLOBULIN SER CALC-MCNC: 3.6 G/DL (ref 2–4)
GLUCOSE SERPL-MCNC: 85 MG/DL (ref 65–100)
HDLC SERPL-MCNC: 57 MG/DL
HDLC SERPL: 3.5 {RATIO} (ref 0–5)
LDLC SERPL CALC-MCNC: 124 MG/DL (ref 0–100)
LIPID PROFILE,FLP: ABNORMAL
POTASSIUM SERPL-SCNC: 4.2 MMOL/L (ref 3.5–5.1)
PROT SERPL-MCNC: 7.3 G/DL (ref 6.4–8.2)
SODIUM SERPL-SCNC: 139 MMOL/L (ref 136–145)
TRIGL SERPL-MCNC: 90 MG/DL (ref ?–150)
VLDLC SERPL CALC-MCNC: 18 MG/DL

## 2019-04-03 ENCOUNTER — OFFICE VISIT (OUTPATIENT)
Dept: FAMILY MEDICINE CLINIC | Age: 39
End: 2019-04-03

## 2019-04-03 VITALS
TEMPERATURE: 99 F | HEART RATE: 102 BPM | WEIGHT: 165 LBS | BODY MASS INDEX: 33.26 KG/M2 | SYSTOLIC BLOOD PRESSURE: 127 MMHG | DIASTOLIC BLOOD PRESSURE: 84 MMHG

## 2019-04-03 DIAGNOSIS — R04.0 EPISTAXIS: ICD-10-CM

## 2019-04-03 DIAGNOSIS — R03.0 ELEVATED BLOOD-PRESSURE READING, WITHOUT DIAGNOSIS OF HYPERTENSION: ICD-10-CM

## 2019-04-03 DIAGNOSIS — G43.109 MIGRAINE WITH VERTIGO: ICD-10-CM

## 2019-04-03 DIAGNOSIS — R42 CHRONIC VERTIGO: ICD-10-CM

## 2019-04-03 RX ORDER — AMITRIPTYLINE HYDROCHLORIDE 25 MG/1
25 TABLET, FILM COATED ORAL
Qty: 90 TAB | Refills: 3 | Status: SHIPPED | OUTPATIENT
Start: 2019-04-03 | End: 2022-05-20 | Stop reason: SDUPTHER

## 2019-04-03 RX ORDER — ATENOLOL 25 MG/1
25 TABLET ORAL DAILY
Qty: 90 TAB | Refills: 1 | Status: SHIPPED | OUTPATIENT
Start: 2019-04-03 | End: 2022-04-28

## 2019-04-03 RX ORDER — MECLIZINE HYDROCHLORIDE 25 MG/1
25 TABLET ORAL
Qty: 60 TAB | Refills: 3 | Status: SHIPPED | OUTPATIENT
Start: 2019-04-03 | End: 2022-05-20 | Stop reason: SDUPTHER

## 2019-04-03 NOTE — PROGRESS NOTES
Patient seen for discharge with assistance from [employer]  #[id number]. We reviewed the AVS, prescriptions and pharmacy location. The patient confirmed that she has medical insurance now but has not found a new doctor to see. She understands that the CAV is for uninsured patients only. She was given the [phone] phone number to call to help find a [facility] doctor near her who is accepting new patients. The patient expressed understanding and had no questions at the time of discharge.  Juni Leary RN

## 2019-04-03 NOTE — PROGRESS NOTES
HISTORY OF PRESENT ILLNESS  Tiffani Umaña is a 37 y.o. female. HPI  Patient states she is doing well. Has noticed sometimes when she gets upset, feels the ears flushing. Other than that she feels well. Patient states she doesn't have. Patient has been having some bleeding from her nose. Last bleed happened a few hours ago. Review of Systems   Constitutional: Negative for chills and fever. HENT: Positive for nosebleeds. Eyes: Negative for blurred vision and double vision. Respiratory: Negative for cough, shortness of breath and wheezing. Cardiovascular: Negative for chest pain and palpitations. Gastrointestinal: Negative for abdominal pain, constipation, diarrhea, heartburn, nausea and vomiting. Musculoskeletal: Negative for myalgias and neck pain. Neurological: Negative for dizziness, tremors and headaches. Endo/Heme/Allergies:        Heat intolerance. Facial flushing     /84 (BP 1 Location: Left arm)   Pulse (!) 102   Temp 99 °F (37.2 °C) (Oral)   Wt 165 lb (74.8 kg)   LMP 03/28/2019   BMI 33.26 kg/m²   Physical Exam   Constitutional: She is oriented to person, place, and time. She appears well-developed and well-nourished. HENT:   Head: Normocephalic. Right Ear: External ear normal.   Left Ear: External ear normal.   Active bleeding, kiesselbach plexus right side   Eyes: Pupils are equal, round, and reactive to light. Conjunctivae and EOM are normal.   Neck: Normal range of motion. Neck supple. No tracheal deviation present. No thyromegaly present. Cardiovascular: Regular rhythm. tachycardia   Pulmonary/Chest: Effort normal and breath sounds normal. No respiratory distress. She has no wheezes. She has no rales. Abdominal: Soft. Bowel sounds are normal. She exhibits no distension. There is no tenderness. Musculoskeletal: Normal range of motion. She exhibits no edema. Neurological: She is alert and oriented to person, place, and time.  She has normal reflexes. Skin: Skin is warm. No rash noted. No pallor. ASSESSMENT and PLAN  Diagnoses and all orders for this visit:    1. Migraine with vertigo  -     meclizine (ANTIVERT) 25 mg tablet; Take 1 Tab by mouth three (3) times daily as needed for Dizziness. -     amitriptyline (ELAVIL) 25 mg tablet; Take 1 Tab by mouth nightly. [place] 1 tableta y media por boca en la noche. 2. Elevated blood-pressure reading, without diagnosis of hypertension  -     atenolol (TENORMIN) 25 mg tablet; Take 1 Tab by mouth daily. 3. Epistaxis  -     atenolol (TENORMIN) 25 mg tablet; Take 1 Tab by mouth daily. 4. Chronic vertigo  -     amitriptyline (ELAVIL) 25 mg tablet; Take 1 Tab by mouth nightly.  [place] 1 tableta y media por boca en la noche.    silver nitrate stick applied to right kiesselbach plexus, bleeding stopped

## 2019-04-03 NOTE — PROGRESS NOTES
Coordination of Care  1. Have you been to the ER, urgent care clinic since your last visit? Hospitalized since your last visit? Yes When: Hospital visit for foot pain. 2 weeks ago Where: n    2. Have you seen or consulted any other health care providers outside of the [address] [place] [address] since your last visit? Include any pap smears or colon screening. No    Does the patient need refills? YES    Learning Assessment Complete?  yes  Depression Screening complete in the past 12 months? yes

## 2019-07-18 ENCOUNTER — HOSPITAL ENCOUNTER (EMERGENCY)
Age: 39
Discharge: HOME | End: 2019-07-18
Attending: EMERGENCY MEDICINE
Payer: COMMERCIAL

## 2019-07-18 VITALS
SYSTOLIC BLOOD PRESSURE: 126 MMHG | OXYGEN SATURATION: 99 % | DIASTOLIC BLOOD PRESSURE: 80 MMHG | HEART RATE: 92 BPM | TEMPERATURE: 98.6 F | RESPIRATION RATE: 16 BRPM

## 2019-07-18 DIAGNOSIS — R51.9 ACUTE NONINTRACTABLE HEADACHE, UNSPECIFIED HEADACHE TYPE: Primary | ICD-10-CM

## 2019-07-18 LAB
COMMENT, HOLDF: NORMAL
SAMPLES BEING HELD,HOLD: NORMAL

## 2019-07-18 PROCEDURE — 74011250636 HC RX REV CODE- 250/636: Performed by: PHYSICIAN ASSISTANT

## 2019-07-18 PROCEDURE — 99283 EMERGENCY DEPT VISIT LOW MDM: CPT

## 2019-07-18 PROCEDURE — 36415 COLL VENOUS BLD VENIPUNCTURE: CPT

## 2019-07-18 PROCEDURE — 96361 HYDRATE IV INFUSION ADD-ON: CPT

## 2019-07-18 PROCEDURE — 96375 TX/PRO/DX INJ NEW DRUG ADDON: CPT

## 2019-07-18 PROCEDURE — 96374 THER/PROPH/DIAG INJ IV PUSH: CPT

## 2019-07-18 RX ORDER — DIPHENHYDRAMINE HYDROCHLORIDE 50 MG/ML
25 INJECTION, SOLUTION INTRAMUSCULAR; INTRAVENOUS
Status: COMPLETED | OUTPATIENT
Start: 2019-07-18 | End: 2019-07-18

## 2019-07-18 RX ORDER — METOCLOPRAMIDE HYDROCHLORIDE 5 MG/ML
10 INJECTION INTRAMUSCULAR; INTRAVENOUS
Status: COMPLETED | OUTPATIENT
Start: 2019-07-18 | End: 2019-07-18

## 2019-07-18 RX ORDER — KETOROLAC TROMETHAMINE 30 MG/ML
15 INJECTION, SOLUTION INTRAMUSCULAR; INTRAVENOUS
Status: COMPLETED | OUTPATIENT
Start: 2019-07-18 | End: 2019-07-18

## 2019-07-18 RX ADMIN — SODIUM CHLORIDE 1000 ML: 900 INJECTION, SOLUTION INTRAVENOUS at 14:33

## 2019-07-18 RX ADMIN — DIPHENHYDRAMINE HYDROCHLORIDE 25 MG: 50 INJECTION, SOLUTION INTRAMUSCULAR; INTRAVENOUS at 14:34

## 2019-07-18 RX ADMIN — KETOROLAC TROMETHAMINE 15 MG: 30 INJECTION, SOLUTION INTRAMUSCULAR at 14:34

## 2019-07-18 RX ADMIN — METOCLOPRAMIDE 10 MG: 5 INJECTION, SOLUTION INTRAMUSCULAR; INTRAVENOUS at 14:33

## 2019-07-18 NOTE — DISCHARGE INSTRUCTIONS
Patient Education        Dolor de Hailey Bees: Instrucciones de cuidado - [ Headache: Care Instructions ]  Instrucciones de cuidado    Los krupa de michele tienen muchas causas posibles. La mayoría de los krupa de michele no son señal de un problema más ana m y mejoran por sí solos. El tratamiento en el hogar podría ayudarlo a sentirse mejor con Ryan Kennedy. El médico lo patrick revisado minuciosamente, manoj puede desarrollar problemas más tarde. Si nota algún problema o síntomas, busque tratamiento médico inmediatamente. La atención de seguimiento es shannan parte clave de hernandez tratamiento y seguridad. Asegúrese de hacer y acudir a todas las citas, y llame a hernandez médico si está teniendo problemas. También es shannan buena idea saber los resultados de moses exámenes y mantener shannan lista de los medicamentos que kandice. ¿Cómo puede cuidarse en el [facility]? · No conduzca si ha tomado analgésicos (medicamentos para el dolor) recetados. · Descanse en un cuarto tranquilo y oscuro hasta que desaparezca el dolor de Hailey Santos. Cierre los ojos y trate de relajarse o dormirse. No mitchell la televisión ni ava. · Colóquese un paño frío y húmedo o [facility] compresa fría sobre la chase adolorida de 10 a 21 minutos cada vez. Póngase un paño brito entre la compresa fría y la piel. · Utilice shannan toalla húmeda tibia o shannan almohadilla térmica ajustada a baja temperatura para relajar los músculos tensos del cony y los hombros.  · Pídale a alguien que le lon masajes suaves en el cony y los hombros.  · [place] los analgésicos exactamente kenna le fueron indicados. ? Si el médico le recetó un analgésico, tómelo según las indicaciones. ? Si no está tomando un analgésico recetado, pregúntele a hernandez médico si puede emilee shila de [employer]. · Tenga cuidado de no emilee analgésicos con mayor frecuencia que la permitida en las indicaciones porque los krupa de michele podrían empeorar o aparecer con mayor frecuencia shannan vez que el medicamento pierda hernandez [place].   · Preste atención a los [employer] que aparecen con el dolor de [place], [place], debilidad o entumecimiento, cambios en la visión o confusión. Podrían ser señales de un problema más grave. Para prevenir los krupa de michele  · [employer] un diario de moses krupa de michele para que pueda averiguar qué los desencadena. Evitar los desencadenantes podría ayudar a prevenir los krupa de [place]. Anote cuándo empieza cada dolor de [place], cuánto dura y cómo es el dolor (palpitante, goyo, punzante o sordo). Anote cualquier otro síntoma que haya tenido con el dolor de [place], [place] náuseas, destellos de macey o ELIEL, o sensibilidad a la macey brillante o a los ruidos gustabo. Anote si el dolor de michele ocurrió cerca de hernandez menstruación. Enumere todos los factores que pudieran chris desencadenado el dolor de [place], kenna ciertos alimentos (chocolate, queso, vino) u olores, humo, luces brillantes, estrés o falta de sueño. · Encuentre maneras saludables de [facility]. Los krupa de [place] son más comunes riceky o ana m después de un momento estresante. Tómese un tiempo para relajarse antes y después de hacer algo que le haya causado un dolor de michele en el pasado. · Trate de mantener moses músculos relajados mediante shannan buena postura. Revise si tiene [employer] de la [place], la matt, el cony y los hombros y trate de relajarlos. Cuando se siente en un escritorio, cambie de posición con frecuencia y estírese por 27 segundos cada hora. · Travis suficiente ejercicio y duerma bastante. · Coma en forma regular y jl. Largos períodos sin comer pueden provocar un dolor de michele. · Regálese un masaje. Algunas personas encuentran que los masajes hechos con regularidad son [place] Eli[place] para aliviar la tensión. · Limite la cafeína. No raisa demasiado café, té ni sodas. Elvira no deje de consumir cafeína de repente, porque eso también puede provocarle krupa de [place].   · Reduzca la tensión en los ojos a causa de la computadora parpadeando con frecuencia y apartando la mirada de la pantalla a menudo. Asegúrese de tener lentes adecuados y de que hernandez monitor esté colocado de manera correcta, kenna a un brazo de distancia. · Busque ayuda si tiene depresión o ansiedad. Park krupa de [place] podrían relacionarse con estas afecciones. El tratamiento puede prevenir los krupa de [place] y ayudar con los síntomas de ansiedad o depresión. ¿Cuándo debe pedir ayuda? Llame al 911 en cualquier momento que piense que puede necesitar atención de emergencia. Por ejemplo, llame si:    · Tiene señales de un ataque cerebral. Estas pueden incluir:  ? Parálisis, entumecimiento o debilidad repentinos en la matt, el brazo o la pierna, sobre todo si ocurre en un solo lado del cuerpo. ? Cambios repentinos en la visión. ? Dificultades repentinas para hablar. ? Confusión repentina o dificultad para comprender frases sencillas. ? Problemas repentinos para caminar o mantener el equilibrio. ? Dolor de [place] intenso y repentino, distinto de los krupa de michele anteriores.    Llame a hernandez médico ahora mismo o busque atención médica inmediata si:    · Tiene un dolor de [place] o peor.     · Hernandez dolor de michele empeora mucho. ¿Dónde puede encontrar más información en inglés? Ariana Score a http://brian-shashank.info/. Escriba H019 en la búsqueda para aprender más acerca de \"Dolor de michele: Instrucciones de cuidado - [ Headache: Care Instructions ]. \"  Revisado: 3 junio, 2018  Versión del contenido: 11.9  © 2551-5785 [employer]. Las instrucciones de cuidado fueron adaptadas bajo licencia por [employer] (which disclaims liability or warranty for this information). Si usted tiene [place] afección médica o sobre estas instrucciones, siempre pregunte a hernandez profesional de manasa. [employer] niega toda garantía o responsabilidad por hernandez uso de esta información.

## 2019-07-18 NOTE — ED PROVIDER NOTES
36 y/o female with no significant PMHx, presenting with complaint of headache. The patient states that she woke up at 5:00 this morning, and around 6:20 she began to notice a left-sided headache which gradually worsened. Her pain is 8/10, throbbing. She has not taken anything to relieve her pain. She reports having a similar headache 1 month ago which lasted 4 days and then resolved. Associated symptoms include chills, tingling in her left arm, light-headedness, generalized weakness, nausea and blurry vision. She also notes some LUQ pain earlier this morning which has resolved. She denies fevers, vomiting, focal weakness, numbness or LOC. The history is provided by the patient. No past medical history on file. Past Surgical History:   Procedure Laterality Date    HX TUBAL LIGATION           No family history on file.     Social History     Socioeconomic History    Marital status:      Spouse name: Not on file    Number of children: Not on file    Years of education: Not on file    Highest education level: Not on file   Occupational History    Not on file   Social Needs    Financial resource strain: Not on file    Food insecurity:     Worry: Not on file     Inability: Not on file    Transportation needs:     Medical: Not on file     Non-medical: Not on file   Tobacco Use    Smoking status: Never Smoker    Smokeless tobacco: Never Used   Substance and Sexual Activity    Alcohol use: No    Drug use: No    Sexual activity: Not on file   Lifestyle    Physical activity:     Days per week: Not on file     Minutes per session: Not on file    Stress: Not on file   Relationships    Social connections:     Talks on phone: Not on file     Gets together: Not on file     Attends [employer] service: Not on file     Active member of club or organization: Not on file     Attends meetings of clubs or organizations: Not on file     Relationship status: Not on file    Intimate partner violence: Fear of current or ex partner: Not on file     Emotionally abused: Not on file     Physically abused: Not on file     Forced sexual activity: Not on file   Other Topics Concern    Not on file   Social History Narrative    Not on file         ALLERGIES: Meloxicam    Review of Systems   Constitutional: Positive for chills. Negative for fever. HENT: Negative for congestion. Eyes: Positive for visual disturbance (blurry vision left eye). Respiratory: Negative for cough. Gastrointestinal: Positive for abdominal pain (LUQ, resolved) and nausea. Negative for vomiting. Musculoskeletal: Negative for arthralgias and myalgias. Neurological: Positive for weakness (generalized), light-headedness and headaches. Negative for numbness (+ left arm tingling). All other systems reviewed and are negative. Vitals:    07/18/19 1325 07/18/19 1345   BP:  115/78   Pulse: 97 (!) 102   Resp:  18   Temp:  98.5 °F (36.9 °C)   SpO2: 98% 97%            Physical Exam   Constitutional: She is oriented to person, place, and time. She appears well-developed and well-nourished. No distress. HENT:   Head: Normocephalic and atraumatic. Eyes: Pupils are equal, round, and reactive to light. Conjunctivae and EOM are normal.   Neck: Normal range of motion. Neck supple. Cardiovascular: Normal rate, regular rhythm and normal heart sounds. Pulmonary/Chest: Effort normal and breath sounds normal.   Neurological: She is alert and oriented to person, place, and time. CN 2-12 tested and intact. 5/5 strength of bilateral upper and lower extremities with intact light touch sensation. Normal finger-nose and rapid alternating movements. Ambulatory with steady gait, no ataxia. Skin: Skin is warm and dry. She is not diaphoretic. Nursing note and vitals reviewed.        MDM  Number of Diagnoses or Management Options  Acute nonintractable headache, unspecified headache type:   Patient Progress  Patient progress: stable Procedures        38 y/o female with no significant PMHx, presenting with complaint of headache. History and exam consistent with migraine headache vs tension headache. No thunderclap onset, not the worst headache of life, doubt SAH. No neuro deficits on exam, low clinical suspicion for intracranial mass, CVA or ICH. Pain improved with headache cocktail. Plan is for discharge home with instructions to establish care with a PCP for follow up. Strict ED return precautions discussed and provided in writing at time of discharge. The patient verbalized understanding and agreement with this plan.

## 2019-07-18 NOTE — ED TRIAGE NOTES
Pt c/o severe headache that started this morning. Pt reporting chills, L sided abdominal pain, blurry vision. Pt reports this pain one month ago but stated that it left after 4 days. Ambulatory, a&ox3. Pt woke up at 5 and the pain has gradually worsened since then.

## 2019-07-18 NOTE — LETTER
Stevie Argueta 53 
[address] [facility] [id number] [phone] Work/School Note Date: 7/18/2019 To Whom It May concern: 
 
Deana Neal was seen and treated today in the emergency room by the following provider(s): 
Attending Provider: Genevieve Marsh MD 
Physician Assistant: MARIZOL Edmond. Deana Neal may return to work on 7/22/19. Sincerely, MARIZOL Valentin

## 2020-02-15 NOTE — PATIENT INSTRUCTIONS
Patient is having flushing, epistaxis, tachycardia and elevated blood pressure. Will start on atenolol once a day and follow up in April 2019.
(4) no impairment

## 2022-03-15 ENCOUNTER — OFFICE VISIT (OUTPATIENT)
Dept: FAMILY MEDICINE CLINIC | Age: 42
End: 2022-03-15
Payer: COMMERCIAL

## 2022-03-15 VITALS
HEIGHT: 59 IN | TEMPERATURE: 97.3 F | SYSTOLIC BLOOD PRESSURE: 112 MMHG | BODY MASS INDEX: 36.29 KG/M2 | RESPIRATION RATE: 16 BRPM | WEIGHT: 180 LBS | HEART RATE: 95 BPM | OXYGEN SATURATION: 97 % | DIASTOLIC BLOOD PRESSURE: 81 MMHG

## 2022-03-15 DIAGNOSIS — R06.83 SNORING: ICD-10-CM

## 2022-03-15 DIAGNOSIS — F41.9 ANXIETY AND DEPRESSION: Primary | ICD-10-CM

## 2022-03-15 DIAGNOSIS — Z76.89 ENCOUNTER TO ESTABLISH CARE: ICD-10-CM

## 2022-03-15 DIAGNOSIS — G43.909 MIGRAINE WITHOUT STATUS MIGRAINOSUS, NOT INTRACTABLE, UNSPECIFIED MIGRAINE TYPE: ICD-10-CM

## 2022-03-15 DIAGNOSIS — F32.A ANXIETY AND DEPRESSION: Primary | ICD-10-CM

## 2022-03-15 DIAGNOSIS — M54.50 ACUTE LEFT-SIDED LOW BACK PAIN WITHOUT SCIATICA: ICD-10-CM

## 2022-03-15 PROCEDURE — 99204 OFFICE O/P NEW MOD 45 MIN: CPT | Performed by: STUDENT IN AN ORGANIZED HEALTH CARE EDUCATION/TRAINING PROGRAM

## 2022-03-15 RX ORDER — PROPRANOLOL HYDROCHLORIDE 60 MG/1
TABLET ORAL
COMMUNITY
Start: 2022-03-09 | End: 2022-05-20 | Stop reason: SDUPTHER

## 2022-03-15 RX ORDER — VENLAFAXINE HYDROCHLORIDE 75 MG/1
75 CAPSULE, EXTENDED RELEASE ORAL DAILY
Qty: 30 CAPSULE | Refills: 1 | Status: SHIPPED | OUTPATIENT
Start: 2022-03-15 | End: 2022-03-15 | Stop reason: SDUPTHER

## 2022-03-15 RX ORDER — TIZANIDINE 2 MG/1
2 TABLET ORAL
Qty: 660 TABLET | Refills: 0 | Status: SHIPPED | OUTPATIENT
Start: 2022-03-15 | End: 2022-03-15 | Stop reason: SDUPTHER

## 2022-03-15 RX ORDER — METFORMIN HYDROCHLORIDE 500 MG/1
TABLET, EXTENDED RELEASE ORAL
COMMUNITY
Start: 2022-03-09 | End: 2022-05-20 | Stop reason: SDUPTHER

## 2022-03-15 RX ORDER — VENLAFAXINE HYDROCHLORIDE 75 MG/1
75 CAPSULE, EXTENDED RELEASE ORAL DAILY
Qty: 30 CAPSULE | Refills: 1 | Status: SHIPPED
Start: 2022-03-15 | End: 2022-04-28

## 2022-03-15 RX ORDER — TIZANIDINE 2 MG/1
2 TABLET ORAL
Qty: 660 TABLET | Refills: 0 | Status: SHIPPED | OUTPATIENT
Start: 2022-03-15 | End: 2022-05-20 | Stop reason: SDUPTHER

## 2022-03-15 NOTE — PROGRESS NOTES
Chief Complaint   Patient presents with   Justin Ho Establish Care     Visit Vitals  /81 (BP 1 Location: Left upper arm, BP Patient Position: Sitting)   Pulse 95   Temp 97.3 °F (36.3 °C) (Axillary)   Resp 16   Ht 4' 11\" (1.499 m)   Wt 180 lb (81.6 kg)   SpO2 97%   BMI 36.36 kg/m²

## 2022-03-15 NOTE — PATIENT INSTRUCTIONS
Venlafaxina (Por la boca)   Trata la depresión, el trastorno de ansiedad generalizado, el trastorno de Ambreen, y el trastorno de ansiedad social.  Adonis(s) : Effexor XR   Existen muchas otras marcas de Dueñas. Belen medicamento no debe ser usado cuando:   Belen medicamento no es adecuado para todas las personas. No lo use si ha tenido shannan reacción alérgica a la venlafaxina o al succinato de desvenlafaxina. Forma de usar belen medicamento:   Cápsula de liberación prolongada, [place], Tableta de liberación prolongada  · Cicero moses medicamentos kenna se le haya indicado. Es probable que sea necesario cambiar hernandez dosis varias veces hasta encontrar la que funciona mejor para usted. · Se recomienda emilee la cápsula de liberación prolongada a la misma hora todos los días (ya sea por [address]). · Lo más conveniente es emilee belen medicamento con comida o con Plaza. · Trague (pase) entera la cápsula de liberación prolongada. No triture, rompa o mastique. No combine la cápsula con ningún líquido. · Si no puede pasar (tragar) la cápsula de liberación prolongada, usted puede abrirla y [employer] gránulos del medicamento con un poco de alimento blando, [place] budín, yogur o Ab Leghorn. Revuelva jl la mezcla y tráguela sin masticarla. · Suhail debe venir con [employer] Guía del medicamento. Solicite shannan copia con hernandez farmacéutico en elizabeth de no tener la guía. · Si olvida shannan dosis: Si olvida shannan dosis de hernandez medicamento, tómelo lo más pronto posible. Si es clifford la hora para hernandez próxima dosis, espere hasta entonces para emilee hernandez dosis regular. No use medicamento adicional para reponer la dosis olvidada. · Guarde el medicamento en un recipiente cerrado a temperatura ambiente y alejado del calor, la humedad y la macey directa.   Medicamentos y [employer] que debe evitar:   Consulte con hernandez médico o farmacéutico antes de usar cualquier STEPHANE Molina, [employer] [place] que compra sin receta [address] [facility] vitaminas y los productos herbales. · No use belen medicamento si usted ha usado un inhibidor de la monoaminooxidasa (IMAO) en los últimos 14 días. No use un inhibidor de la MAO por lo menos 7 días después de dejar de Jarrett Edwards. · Algunos medicamentos podrían afectar la eficacia de la venlafaxina. Informe a hernandez médico si está usando cualquiera de los siguientes:   ¨ Buspirona, cimetidina, fentanilo, ketoconazol, litio, metoprolol, mirtazapina, hierba de [address], tramadol o suplementos de triptofano  ¨ Anfetaminas  ¨ Medicamentos para la presión arterial  ¨ Diurético  ¨ Medicamento para tratar las migrañas  ¨ Medicamento para bajar de peso (incluyendo fentermina)  ¨ Medicamentos analgésicos antiinflamatorios no esteroides, o THANG, para el dolor o la artritis (incluye aspirina, celecoxib, diclofenac, ibuprofeno, naproxeno)  ¨ Antidepresivos tricíclicos  · No consuma alcohol mientras esté . · Informe a hernandez médico si usted [place] cualquier cosa que le provoca sueño. Lauren Pickering son medicamentos para alergia o medicamentos narcóticos para el dolor y el alcohol. Precauciones rickey el uso de belen medicamento:   · Infórmele a hernandez médico si usted está embarazada o dando de lactar, o si padece de [place] riñones, enfermedad del hígado, glaucoma, enfermedad al corazón, presión sanguínea denise, o problemas de tiroides. Coméntele a hernandez médico si tiene antecedentes de manía, convulsiones, ataque cardíaco o derrame cerebral.  · Belen medicamento podría aumentar moses pensamientos de suicidio. Informe inmediatamente a hernandez médico si comienza a sentirse deprimido y a tener pensamientos de hacerse daño propio.   · Belen medicamento puede causar los siguientes problemas:   ¨ Síndrome de la serotonina (cuando se [place] con otros medicamentos)  ¨ Aumento de los niveles de colesterol  ¨ Hipertensión  ¨ Mayor riesgo de tener problemas de sangrado  ¨ Niveles bajos de sodio  ¨ Enfermedad pulmonar intersticial y neumonía eosinofílica  · Belen medicamento podría causarle a usted mareos o somnolencia. No maneje ni lon otra tarea que pueda ser peligrosa hasta que sepa cómo le afecta belen medicamento. · No suspenda el uso de belen medicamento súbitamente. Hernandez médico necesitará disminuir hernandez dosis poco a poco antes de suspender el medicamento por completo. · Dígale a todo médico o dentista encargado de atenderle que usted está usando [employer]. Puede que belen medicamento afecte algunos resultados de [employer]. · El médico solicitará exámenes de laboratorio rickey las citas de rutina para revisar los efectos de [employer]. Asista a todas moses citas. · Guarde todos los medicamentos fuera del alcance de los niños. Nunca comparta moses medicamentos con Electroch[employer]. Efectos secundarios que pueden presentarse rickey el uso de belen medicamento:   Consulte inmediatamente con el médico si nota cualquiera de estos efectos secundarios:  · Reacción alérgica: Comezón o ronchas, hinchazón del lucretia o las candis, hinchazón u hormigueo en la boca o garganta, opresión en el pecho, dificultad para respirar  · Ansiedad, inquietud, fiebre, sudoración, calambres musculares, náusea, vómito, diarrea, dale o escuchar cosas que no existen  · Ampollas, despellejamiento, sarpullido goldberg en la piel.   · Dolor del pecho, tos, dificultad para respirar  · Confusión, debilidad y contracciones musculares  · Dolor de ojos, cambios de la visión, dale halos alrededor de las luces  · Ritmo cardíaco acelerado o darlene  · Sentirse más emocionado o energético que lo usual  · Dolor de michele, dificultad para concentrarse, problemas con la memoria, problemas de equilibrio  · Convulsiones  · Comportamiento inusual, pensamientos de querer hacerse daño a sí mismo o a otros, dificultad para dormir, nerviosismo, sueños inusuales  · Sangrado o moretones inusuales  Consulte con el médico si nota los siguientes efectos secundarios menos graves:   · La boca seca  · Náuseas moderadas, estreñimiento, vómito, pérdida del apetito, pérdida de peso  · Problemas sexuales  · Somnolencia o adormecimiento inusual, mareos  Consulte con el médico si nota otros efectos secundarios que sher son causados por eric medicamento. Llame a hernandez médico para consultarle Felicia. Usted puede notificar moses efectos secundarios al FDA al [phone]-[phone]. © 2017 [facility] Information is for End User's use only and may not be sold, redistributed or otherwise used for commercial purposes. Esta información es sólo para uso en educación. Hernandez intención no es darle un consejo médico sobre enfermedades o tratamientos. Colsulte con hernandez Jetty Nipple farmacéutico antes de seguir cualquier régimen médico para saber si es seguro y efectivo para usted.

## 2022-03-15 NOTE — PROGRESS NOTES
Subjective:     Chief Complaint   Patient presents with    [facility]     HPI:  Ross Catalan is a 38 y.o. female who presents to [facility]. Patient is originally from [facility] been in [place] for about 6 years. Patient admits to having anxiety and depression, history of migraines, tinnitus, labyrinthitis, difficulty sleeping. Currently on propranolol and Elavil. She was placed on propanolol due to periods of tachycardia, and sounds like she was placed on Elavil due to difficulty sleeping, as well as migraines, and possibly tinnitus. Patient continues to have moments of tachycardia. About 6 years ago she got to the [address] guarding depression very bad she would get upset thinking about she was basically forced to leave her country, those thoughts have improved some but continues to weigh on her. 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 She has got an injection years ago in [facility] in her arm for the migraines which helps. For the last couple months headaches have been 4-5 times a week. She does use over-the-counter Tylenol or Excedrin Migraine which does help. Patient also notes having lower back pain. She has taken muscle relaxer in the past which has helped, but does cause some sleepiness. Patient's  states that she has been snoring, stops breathing while she is sleeping. Establish does have headaches constantly, as well as she feels very tired in the morning when she wakes up she has not had a good nights rest.  She has gained weight over the last couple years. She has 2 daughters. Past Medical History:   Diagnosis Date    Diabetes ([address]      Family History   Family history unknown: Yes      reports that she has never smoked. She has never used smokeless tobacco. She reports that she does not drink alcohol and does not use drugs.   Current Outpatient Medications on File Prior to Visit   Medication Sig Dispense Refill    propranoloL (INDERAL) 60 mg tablet TAKE 1 TABLET BY MOUTH TWICE DAILY FOR 90 DAYS      metFORMIN ER (GLUCOPHAGE XR) 500 mg tablet TAKE 1 TABLET BY MOUTH ONCE DAILY FOR 90 DAYS      meclizine (ANTIVERT) 25 mg tablet Take 1 Tab by mouth three (3) times daily as needed for Dizziness. 60 Tab 3    atenolol (TENORMIN) 25 mg tablet Take 1 Tab by mouth daily. 90 Tab 1    amitriptyline (ELAVIL) 25 mg tablet Take 1 Tab by mouth nightly. [place] 1 tableta y media por boca en la noche. 90 Tab 3    ferrous sulfate (IRON) 325 mg (65 mg iron) EC tablet Take 1 Tab by mouth ACB/HS. [place] 1 tableta por boca 2 veces al hilton antes de comer. 60 Tab 3     No current facility-administered medications on file prior to visit. Allergies   Allergen Reactions    Meloxicam Nausea Only, Anxiety and Other (comments)     Facial redness       Review of Systems   All other systems reviewed and are negative. Objective:     Vitals:    03/15/22 1400   BP: 112/81   Pulse: 95   Resp: 16   Temp: 97.3 °F (36.3 °C)   TempSrc: Axillary   SpO2: 97%   Weight: 180 lb (81.6 kg)   Height: 4' 11\" (1.499 m)     Physical Exam  Vitals reviewed. Constitutional:       Appearance: Normal appearance. She is obese. HENT:      Head: Normocephalic and atraumatic. Eyes:      Extraocular Movements: Extraocular movements intact. Conjunctiva/sclera: Conjunctivae normal.   Cardiovascular:      Rate and Rhythm: Normal rate and regular rhythm. Pulses: Normal pulses. Heart sounds: Normal heart sounds. No murmur heard. Pulmonary:      Effort: Pulmonary effort is normal.      Breath sounds: Normal breath sounds. No wheezing. Abdominal:      Palpations: Abdomen is soft. Tenderness: There is no abdominal tenderness. Musculoskeletal:      Cervical back: Neck supple. Lumbar back: Spasms (Bilateral lumbar spine) present. No tenderness (Sacral/coccyx area). Skin:     General: Skin is warm and dry.    Neurological:      Mental Status: She is alert and oriented to person, place, and time. Mental status is at baseline. Assessment/Plan:       ICD-10-CM ICD-9-CM    1. Anxiety and depression  F41.9 300.00     F32. A 311    2. Migraine without status migrainosus, not intractable, unspecified migraine type  G43.909 346.90 venlafaxine-SR (EFFEXOR-XR) 75 mg capsule      DISCONTINUED: venlafaxine-SR (EFFEXOR-XR) 75 mg capsule   3. Acute left-sided low back pain without sciatica  M54.50 724.2 XR SPINE LUMB 2 OR 3 V      tiZANidine (ZANAFLEX) 2 mg tablet      DISCONTINUED: tiZANidine (ZANAFLEX) 2 mg tablet   4. Snoring  R06.83 786.09 SLEEP MEDICINE REFERRAL   5. Encounter to establish care  Z76.89 V65.8        Encounter to establish care -discussed past medical history. Anxiety/depression/panic attacks -Effexor ordered. Continue Elavil. Follow-up in 1 month. Migraines-Effexor ordered. Continue Elavil and propanolol. Continue Tylenol, or Excedrin as needed for now. May benefit from Imitrex in the future. Low back pain-muscle relaxer ordered, continue OTC medications. I suspect she has muscle strain secondary to heavy lifting, but she may have some underlying arthritic or DDD changes due to heavy lifting. Follow-up x-ray. Snoring/apnea-suspect patient has sleep apnea. Sleep referral placed. Follow-up and Dispositions    · Return in about 1 month (around 4/15/2022) for Anxiety, and Depression.             Marissa Godfrey MD

## 2022-03-17 ENCOUNTER — TELEPHONE (OUTPATIENT)
Dept: SLEEP MEDICINE | Age: 42
End: 2022-03-17

## 2022-03-17 PROBLEM — G43.909 MIGRAINE WITHOUT STATUS MIGRAINOSUS, NOT INTRACTABLE: Status: ACTIVE | Noted: 2022-03-17

## 2022-03-17 NOTE — TELEPHONE ENCOUNTER
Unable to reach patient or LVM on 03/17/2022 at 10:53am to schedule a sleep medicine consult visit per Dr. Germán Mauricio.

## 2022-04-27 ENCOUNTER — HOSPITAL ENCOUNTER (OUTPATIENT)
Dept: GENERAL RADIOLOGY | Age: 42
Discharge: HOME | End: 2022-04-27
Payer: COMMERCIAL

## 2022-04-27 DIAGNOSIS — M54.50 ACUTE LEFT-SIDED LOW BACK PAIN WITHOUT SCIATICA: ICD-10-CM

## 2022-04-27 PROCEDURE — 72100 X-RAY EXAM L-S SPINE 2/3 VWS: CPT

## 2022-04-28 ENCOUNTER — OFFICE VISIT (OUTPATIENT)
Dept: FAMILY MEDICINE CLINIC | Age: 42
End: 2022-04-28
Payer: COMMERCIAL

## 2022-04-28 DIAGNOSIS — F41.9 ANXIETY AND DEPRESSION: ICD-10-CM

## 2022-04-28 DIAGNOSIS — G89.29 CHRONIC LEFT-SIDED LOW BACK PAIN WITHOUT SCIATICA: Primary | ICD-10-CM

## 2022-04-28 DIAGNOSIS — F32.A ANXIETY AND DEPRESSION: ICD-10-CM

## 2022-04-28 DIAGNOSIS — M54.50 CHRONIC LEFT-SIDED LOW BACK PAIN WITHOUT SCIATICA: Primary | ICD-10-CM

## 2022-04-28 DIAGNOSIS — R00.0 TACHYCARDIA: ICD-10-CM

## 2022-04-28 PROCEDURE — 99214 OFFICE O/P EST MOD 30 MIN: CPT | Performed by: STUDENT IN AN ORGANIZED HEALTH CARE EDUCATION/TRAINING PROGRAM

## 2022-04-28 NOTE — LETTER
4/28/2022 4:39 PM    Ms. Mark Mascorro [id number]      Mrs. Curtis Azar is under the care of Pinky Novak, she has multiple medical conditions which may limit the amount of weight she is able to carry,and the amount of time she is able to work. If you have any questions please do not hesitate to ask.         Sincerely,      Sary Machuca MD

## 2022-04-28 NOTE — PROGRESS NOTES
Chief Complaint   Patient presents with    Follow-up    Anxiety    Depression     Visit Vitals  /88 (BP 1 Location: Left upper arm, BP Patient Position: Sitting)   Pulse (!) 122   Temp 98 °F (36.7 °C) (Axillary)   Resp 16   Ht 4' 11\" (1.499 m)   Wt 187 lb (84.8 kg)   SpO2 98%   BMI 37.77 kg/m²   1. Have you been to the ER, urgent care clinic since your last visit? Hospitalized since your last visit? No    2. Have you seen or consulted any other health care providers outside of the [address] [place] [address] since your last visit? Include any pap smears or colon screening.  No

## 2022-04-29 NOTE — PROGRESS NOTES
Subjective:     Chief Complaint   Patient presents with    Follow-up    Anxiety    Depression     HPI:  Elen Painting is a 38 y.o. female patient is here for follow-up of anxiety, depression, low back pain. Last visit she she complained of anxiety, some depression and panic attacks. Effexor has been ordered as it would help with her migraines as well. Patient never started Effexor as if she began taking muscle relaxer for back pain she felt that it helped her anxiety as well. Has been taking the muscle relaxer only as needed about once a day. Continues to have low back pain but stated above has improved. X-ray was positive for mild L5-S1 retrolisthesis. Patient has to lift heavy objects for work. Sometimes your 40 pounds. There is supposedly a 40 pound weight limit but she feels sometimes it is over that. Patient Active Problem List    Diagnosis    Plantar fasciitis    Retrolisthesis     L5 on S1      Snoring    Migraine without status migrainosus, not intractable    Anxiety and depression    Acute left-sided low back pain without sciatica     Past Medical History:   Diagnosis Date    [address] [address]      Family History   Family history unknown: Yes      reports that she has never smoked. She has never used smokeless tobacco. She reports that she does not drink alcohol and does not use drugs. Current Outpatient Medications on File Prior to Visit   Medication Sig Dispense Refill    propranoloL (INDERAL) 60 mg tablet TAKE 1 TABLET BY MOUTH TWICE DAILY FOR 90 DAYS      metFORMIN ER (GLUCOPHAGE XR) 500 mg tablet TAKE 1 TABLET BY MOUTH ONCE DAILY FOR 90 DAYS      tiZANidine (ZANAFLEX) 2 mg tablet Take 1 Tablet by mouth three (3) times daily as needed for Muscle Spasm(s). 660 Tablet 0    meclizine (ANTIVERT) 25 mg tablet Take 1 Tab by mouth three (3) times daily as needed for Dizziness. 60 Tab 3    amitriptyline (ELAVIL) 25 mg tablet Take 1 Tab by mouth nightly.  [place] 1 tableta y media por boca en la noche. 90 Tab 3    ferrous sulfate (IRON) 325 mg (65 mg iron) EC tablet Take 1 Tab by mouth ACB/HS. [place] 1 tableta por boca 2 veces al hilton antes de comer. 60 Tab 3     No current facility-administered medications on file prior to visit. Allergies   Allergen Reactions    Meloxicam Nausea Only, Anxiety and Other (comments)     Facial redness       Review of Systems   All other systems reviewed and are negative. Objective:     Vitals:    04/28/22 1608 04/28/22 1620   BP: 130/88    Pulse: (!) 122 (!) 101   Resp: 16    Temp: 98 °F (36.7 °C)    TempSrc: Axillary    SpO2: 98%    Weight: 187 lb (84.8 kg)    Height: 4' 11\" (1.499 m)      Physical Exam  Vitals reviewed. Constitutional:       Appearance: Normal appearance. HENT:      Head: Normocephalic and atraumatic. Cardiovascular:      Rate and Rhythm: Normal rate. Pulmonary:      Effort: Pulmonary effort is normal.   Neurological:      Mental Status: She is alert and oriented to person, place, and time. Psychiatric:         Behavior: Behavior normal.            Assessment/Plan:         1. Chronic left-sided low back pain without sciatica      -Mild retrolisthesis seen on x-ray. Advised to use proper positioning when bending over and working. Work letter written today. If needed we will have him fill out documentation for restrictions at work. 2. Anxiety and depression    Has improved despite not starting Effexor. She is on Elavil for migraines and sleep. Continue current management, and if worsens again we would try the Effexor(helps with migraines as well). 3.  Tachycardia  -This is chronic for her. Asymptomatic. We will check ECG next visit if tachycardic. Follow-up and Dispositions    · Return in about 6 months (around 10/28/2022) for Wellness.           Julien Najjar, MD

## 2022-04-30 VITALS
WEIGHT: 187 LBS | RESPIRATION RATE: 16 BRPM | HEIGHT: 59 IN | TEMPERATURE: 98 F | SYSTOLIC BLOOD PRESSURE: 130 MMHG | DIASTOLIC BLOOD PRESSURE: 88 MMHG | HEART RATE: 101 BPM | BODY MASS INDEX: 37.7 KG/M2 | OXYGEN SATURATION: 98 %

## 2022-05-02 ENCOUNTER — LAB ONLY (OUTPATIENT)
Dept: FAMILY MEDICINE CLINIC | Age: 42
End: 2022-05-02

## 2022-05-02 DIAGNOSIS — Z01.89 ROUTINE LAB DRAW: Primary | ICD-10-CM

## 2022-05-02 DIAGNOSIS — Z11.59 ENCOUNTER FOR HCV SCREENING TEST FOR HIGH RISK PATIENT: ICD-10-CM

## 2022-05-02 DIAGNOSIS — Z91.89 ENCOUNTER FOR HCV SCREENING TEST FOR HIGH RISK PATIENT: ICD-10-CM

## 2022-05-02 DIAGNOSIS — F32.A ANXIETY AND DEPRESSION: ICD-10-CM

## 2022-05-02 DIAGNOSIS — F41.9 ANXIETY AND DEPRESSION: ICD-10-CM

## 2022-05-02 DIAGNOSIS — R00.0 TACHYCARDIA: ICD-10-CM

## 2022-05-03 DIAGNOSIS — E78.00 PURE HYPERCHOLESTEROLEMIA: ICD-10-CM

## 2022-05-03 DIAGNOSIS — E05.90 HYPERTHYROIDISM: Primary | ICD-10-CM

## 2022-05-03 LAB
ALBUMIN SERPL-MCNC: 4.3 G/DL (ref 3.8–4.8)
ALBUMIN/GLOB SERPL: 1.4 {RATIO} (ref 1.2–2.2)
ALP SERPL-CCNC: 97 IU/L (ref 44–121)
ALT SERPL-CCNC: 28 IU/L (ref 0–32)
AST SERPL-CCNC: 27 IU/L (ref 0–40)
BASOPHILS # BLD AUTO: 0 X10E3/UL (ref 0–0.2)
BASOPHILS NFR BLD AUTO: 1 %
BILIRUB SERPL-MCNC: 0.5 MG/DL (ref 0–1.2)
BUN SERPL-MCNC: 11 MG/DL (ref 6–24)
BUN/CREAT SERPL: 18 (ref 9–23)
CALCIUM SERPL-MCNC: 9.2 MG/DL (ref 8.7–10.2)
CHLORIDE SERPL-SCNC: 102 MMOL/L (ref 96–106)
CHOLEST SERPL-MCNC: 190 MG/DL (ref 100–199)
CO2 SERPL-SCNC: 22 MMOL/L (ref 20–29)
CREAT SERPL-MCNC: 0.62 MG/DL (ref 0.57–1)
EGFR: 113 ML/MIN/1.73
EOSINOPHIL # BLD AUTO: 0.1 X10E3/UL (ref 0–0.4)
EOSINOPHIL NFR BLD AUTO: 2 %
ERYTHROCYTE [DISTWIDTH] IN BLOOD BY AUTOMATED COUNT: 13.7 % (ref 11.7–15.4)
GLOBULIN SER CALC-MCNC: 3 G/DL (ref 1.5–4.5)
GLUCOSE SERPL-MCNC: 107 MG/DL (ref 65–99)
HCT VFR BLD AUTO: 38.9 % (ref 34–46.6)
HCV AB S/CO SERPL IA: <0.1 S/CO RATIO (ref 0–0.9)
HDLC SERPL-MCNC: 54 MG/DL
HGB BLD-MCNC: 12.7 G/DL (ref 11.1–15.9)
IMM GRANULOCYTES # BLD AUTO: 0 X10E3/UL (ref 0–0.1)
IMM GRANULOCYTES NFR BLD AUTO: 0 %
LDLC SERPL CALC-MCNC: 120 MG/DL (ref 0–99)
LYMPHOCYTES # BLD AUTO: 2 X10E3/UL (ref 0.7–3.1)
LYMPHOCYTES NFR BLD AUTO: 34 %
MCH RBC QN AUTO: 27.3 PG (ref 26.6–33)
MCHC RBC AUTO-ENTMCNC: 32.6 G/DL (ref 31.5–35.7)
MCV RBC AUTO: 84 FL (ref 79–97)
MONOCYTES # BLD AUTO: 0.5 X10E3/UL (ref 0.1–0.9)
MONOCYTES NFR BLD AUTO: 8 %
NEUTROPHILS # BLD AUTO: 3.2 X10E3/UL (ref 1.4–7)
NEUTROPHILS NFR BLD AUTO: 55 %
PLATELET # BLD AUTO: 326 X10E3/UL (ref 150–450)
POTASSIUM SERPL-SCNC: 4.5 MMOL/L (ref 3.5–5.2)
PROT SERPL-MCNC: 7.3 G/DL (ref 6–8.5)
RBC # BLD AUTO: 4.66 X10E6/UL (ref 3.77–5.28)
SODIUM SERPL-SCNC: 136 MMOL/L (ref 134–144)
TRIGL SERPL-MCNC: 86 MG/DL (ref 0–149)
TSH SERPL DL<=0.005 MIU/L-ACNC: 0.41 UIU/ML (ref 0.45–4.5)
VLDLC SERPL CALC-MCNC: 16 MG/DL (ref 5–40)
WBC # BLD AUTO: 5.8 X10E3/UL (ref 3.4–10.8)

## 2022-05-03 NOTE — PROGRESS NOTES
Please call patient and inform:Her thyroid function is abnormal and may be hyperactive. Additional labs have been ordered for now. She had tachycardia yesterday this could be contributing to that. Cholesterol is elevated, but at this point she does not need medication as her cardiovascular score is low. Is for labs are normal include normal liver, kidneys, electrolytes. No anemia.     The 10-year ASCVD risk score (Rodrigo Cohn, et al., 2013) is: 0.7%

## 2022-05-13 ENCOUNTER — TELEPHONE (OUTPATIENT)
Dept: FAMILY MEDICINE CLINIC | Age: 42
End: 2022-05-13

## 2022-05-13 DIAGNOSIS — K04.7 DENTAL INFECTION: Primary | ICD-10-CM

## 2022-05-13 NOTE — TELEPHONE ENCOUNTER
[facility] paperwork was dropped off at the office for completion. Forms are in the file on the wall.

## 2022-05-20 ENCOUNTER — PATIENT MESSAGE (OUTPATIENT)
Dept: FAMILY MEDICINE CLINIC | Age: 42
End: 2022-05-20

## 2022-05-20 DIAGNOSIS — M54.50 ACUTE LEFT-SIDED LOW BACK PAIN WITHOUT SCIATICA: ICD-10-CM

## 2022-05-20 DIAGNOSIS — R42 CHRONIC VERTIGO: ICD-10-CM

## 2022-05-20 DIAGNOSIS — G43.109 MIGRAINE WITH VERTIGO: ICD-10-CM

## 2022-05-20 RX ORDER — AMITRIPTYLINE HYDROCHLORIDE 25 MG/1
25 TABLET, FILM COATED ORAL
Qty: 90 TABLET | Refills: 3 | Status: SHIPPED | OUTPATIENT
Start: 2022-05-20 | End: 2022-08-25 | Stop reason: SDUPTHER

## 2022-05-20 RX ORDER — TIZANIDINE 2 MG/1
2 TABLET ORAL
Qty: 270 TABLET | Refills: 1 | Status: SHIPPED | OUTPATIENT
Start: 2022-05-20 | End: 2022-10-24 | Stop reason: SDUPTHER

## 2022-05-20 RX ORDER — PROPRANOLOL HYDROCHLORIDE 60 MG/1
TABLET ORAL
Qty: 180 TABLET | Refills: 1 | Status: SHIPPED | OUTPATIENT
Start: 2022-05-20

## 2022-05-20 RX ORDER — MECLIZINE HYDROCHLORIDE 25 MG/1
25 TABLET ORAL
Qty: 60 TABLET | Refills: 3 | Status: SHIPPED | OUTPATIENT
Start: 2022-05-20

## 2022-05-20 RX ORDER — METFORMIN HYDROCHLORIDE 500 MG/1
TABLET, EXTENDED RELEASE ORAL
Qty: 90 TABLET | Refills: 1 | Status: SHIPPED | OUTPATIENT
Start: 2022-05-20

## 2022-05-20 NOTE — TELEPHONE ENCOUNTER
Pt phoned checking on the status of the [place] papers that were dropped on and would like for them to be completed as soon as possible since today is the last day they can be turned in

## 2022-05-28 RX ORDER — AMOXICILLIN AND CLAVULANATE POTASSIUM 875; 125 MG/1; MG/1
1 TABLET, FILM COATED ORAL 2 TIMES DAILY
Qty: 20 TABLET | Refills: 0 | Status: SHIPPED | OUTPATIENT
Start: 2022-05-28 | End: 2022-06-07

## 2022-05-28 NOTE — TELEPHONE ENCOUNTER
Spoke with patient regarding the [employer] accommodation form. Patient history of low back pain which started about August 2021. X-ray was positive for retrolisthesis of lumbar spine. She has been taking muscle relaxer for pain which has helped. She has history of plantar fasciitis was started about 2018. It eventually improved on its own, and earlier this year she was ordered to wear special shoes at [employer], once she stopped wearing the shoes her plantar fasciitis is acting up again. Due to her back pain she is limited the amount of time she could bend over without getting pain. Climbing stairs and walking long distances exacerbates her plantar fasciitis. She denies use of back brace. She does use topical lidocaine patch which helps her back. Patient recently lost a filling from her teeth, and it is red, painful, and swollen in the area. She is worried she may have an infection. Scheduled to see the dentist in a couple days. She would like antibiotic ordered. She has received antibiotic for this in the past.  Augmentin ordered. [employer] form filled.

## 2022-08-25 DIAGNOSIS — G43.109 MIGRAINE WITH VERTIGO: ICD-10-CM

## 2022-08-25 DIAGNOSIS — R42 CHRONIC VERTIGO: ICD-10-CM

## 2022-08-25 RX ORDER — AMITRIPTYLINE HYDROCHLORIDE 25 MG/1
25 TABLET, FILM COATED ORAL
Qty: 90 TABLET | Refills: 3 | Status: SHIPPED | OUTPATIENT
Start: 2022-08-25 | End: 2022-08-26 | Stop reason: SDUPTHER

## 2022-08-26 DIAGNOSIS — G43.109 MIGRAINE WITH VERTIGO: ICD-10-CM

## 2022-08-26 DIAGNOSIS — R42 CHRONIC VERTIGO: ICD-10-CM

## 2022-08-26 RX ORDER — AMITRIPTYLINE HYDROCHLORIDE 25 MG/1
25 TABLET, FILM COATED ORAL
Qty: 90 TABLET | Refills: 3 | Status: SHIPPED | OUTPATIENT
Start: 2022-08-26 | End: 2022-09-06 | Stop reason: SDUPTHER

## 2022-08-26 NOTE — TELEPHONE ENCOUNTER
Message from pt's 's chart. From: [facility]  To:  Franco Clancy MD  Sent: 8/26/2022  8:01 AM EDT  Subject: Medicamento     Doctor buenos días énvielo a mi farm[facility] porque no la cubre el seguro con dino

## 2022-09-06 DIAGNOSIS — R42 CHRONIC VERTIGO: ICD-10-CM

## 2022-09-06 DIAGNOSIS — G43.109 MIGRAINE WITH VERTIGO: ICD-10-CM

## 2022-09-06 RX ORDER — AMITRIPTYLINE HYDROCHLORIDE 25 MG/1
25 TABLET, FILM COATED ORAL
Qty: 90 TABLET | Refills: 3 | Status: SHIPPED | OUTPATIENT
Start: 2022-09-06

## 2022-09-12 ENCOUNTER — TELEPHONE (OUTPATIENT)
Dept: FAMILY MEDICINE CLINIC | Age: 42
End: 2022-09-12

## 2022-09-12 NOTE — TELEPHONE ENCOUNTER
----- Message from Luisana Walls sent at 9/12/2022 11:16 AM EDT -----  Regarding: FW: Gabrielle virtual    ----- Message -----  From: Karine Suarez  Sent: 9/12/2022  10:42 AM EDT  To: [facility] Nurse  Subject: Gabrielle virtual                                     This message is being sent by [employer] Light on behalf of Karine Suarez.     [id number] [address]

## 2022-09-12 NOTE — TELEPHONE ENCOUNTER
----- Message from Enoc Lerner MD sent at 9/11/2022  9:15 PM EDT -----  Regarding: FW: Griffin Cervantes can you call her and set up a virtual visit, or in person visit.  ----- Message -----  From: Lorna Damian: 9/9/2022   2:51 PM EDT  To: Enoc Lerner MD  Subject: Marbin Cadet: Gabrielle virtual                                   ----- Message -----  From: Zaire Zapien  Sent: 9/9/2022   2:49 PM EDT  To: [facility] Nurse  Subject: Gabrielle virtual                                     This message is being sent by Seferino Oseguera on behalf of Zaire Zapien.     Doctor devan alvarez

## 2022-09-12 NOTE — TELEPHONE ENCOUNTER
I called pt x2 and it goes straight to  and says the Vm box has not been set up yet. I was unable to leave  .

## 2022-09-15 ENCOUNTER — TELEPHONE (OUTPATIENT)
Dept: FAMILY MEDICINE CLINIC | Age: 42
End: 2022-09-15

## 2022-09-15 NOTE — TELEPHONE ENCOUNTER
----- Message from Burgess Gloria MD sent at 9/15/2022 12:35 PM EDT -----  Regarding: Appt  Patient is an appointment soon to fill out [employer] paperwork. It looks like she has an appointment in October, but if she wants to be seen sooner he can have her place for virtual visit this Friday at 26, or next Friday at what ever time.

## 2022-09-15 NOTE — TELEPHONE ENCOUNTER
Tried calling # on pt chart goes to VM and box is not set up . Called pt daughter x2 no answer . Called pt  # no answer but I was able to leave a VM in Korean asking them to call back to make an appt and to ask for me .

## 2022-10-24 PROBLEM — M54.17 LUMBOSACRAL RADICULOPATHY: Status: ACTIVE | Noted: 2022-10-24
